# Patient Record
Sex: MALE | Race: BLACK OR AFRICAN AMERICAN | NOT HISPANIC OR LATINO | Employment: STUDENT | URBAN - METROPOLITAN AREA
[De-identification: names, ages, dates, MRNs, and addresses within clinical notes are randomized per-mention and may not be internally consistent; named-entity substitution may affect disease eponyms.]

---
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
## 2022-01-04 ENCOUNTER — TELEPHONE (OUTPATIENT)
Dept: PSYCHIATRY | Facility: CLINIC | Age: 22
End: 2022-01-04

## 2022-01-06 ENCOUNTER — TELEMEDICINE (OUTPATIENT)
Dept: PSYCHIATRY | Facility: CLINIC | Age: 22
End: 2022-01-06
Payer: COMMERCIAL

## 2022-01-06 DIAGNOSIS — F63.81 INTERMITTENT EXPLOSIVE DISORDER IN ADULT: ICD-10-CM

## 2022-01-06 DIAGNOSIS — F41.1 GENERALIZED ANXIETY DISORDER: Primary | ICD-10-CM

## 2022-01-06 DIAGNOSIS — Z79.899 MEDICAL CANNABIS USE: ICD-10-CM

## 2022-01-06 DIAGNOSIS — F42.2 MIXED OBSESSIONAL THOUGHTS AND ACTS: ICD-10-CM

## 2022-01-06 PROCEDURE — 99214 OFFICE O/P EST MOD 30 MIN: CPT | Performed by: STUDENT IN AN ORGANIZED HEALTH CARE EDUCATION/TRAINING PROGRAM

## 2022-01-06 PROCEDURE — 90833 PSYTX W PT W E/M 30 MIN: CPT | Performed by: STUDENT IN AN ORGANIZED HEALTH CARE EDUCATION/TRAINING PROGRAM
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
## 2022-02-15 ENCOUNTER — TELEMEDICINE (OUTPATIENT)
Dept: BEHAVIORAL/MENTAL HEALTH CLINIC | Facility: CLINIC | Age: 22
End: 2022-02-15
Payer: COMMERCIAL

## 2022-02-15 DIAGNOSIS — F42.2 MIXED OBSESSIONAL THOUGHTS AND ACTS: ICD-10-CM

## 2022-02-15 DIAGNOSIS — F63.81 INTERMITTENT EXPLOSIVE DISORDER IN ADULT: ICD-10-CM

## 2022-02-15 DIAGNOSIS — F41.1 GENERALIZED ANXIETY DISORDER: Primary | ICD-10-CM

## 2022-02-15 PROCEDURE — 90834 PSYTX W PT 45 MINUTES: CPT | Performed by: SOCIAL WORKER
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
## 2022-03-04 ENCOUNTER — APPOINTMENT (OUTPATIENT)
Dept: LAB | Facility: MEDICAL CENTER | Age: 22
End: 2022-03-04
Payer: COMMERCIAL

## 2022-03-11 ENCOUNTER — HOSPITAL ENCOUNTER (EMERGENCY)
Facility: HOSPITAL | Age: 22
End: 2022-03-11
Attending: EMERGENCY MEDICINE
Payer: COMMERCIAL

## 2022-03-11 ENCOUNTER — HOSPITAL ENCOUNTER (OUTPATIENT)
Facility: HOSPITAL | Age: 22
Setting detail: OBSERVATION
Discharge: HOME | End: 2022-03-13
Attending: INTERNAL MEDICINE | Admitting: SURGERY
Payer: COMMERCIAL

## 2022-03-11 ENCOUNTER — APPOINTMENT (EMERGENCY)
Dept: CT IMAGING | Facility: HOSPITAL | Age: 22
End: 2022-03-11
Payer: COMMERCIAL

## 2022-03-11 VITALS
DIASTOLIC BLOOD PRESSURE: 81 MMHG | HEIGHT: 75 IN | RESPIRATION RATE: 17 BRPM | OXYGEN SATURATION: 97 % | TEMPERATURE: 96.4 F | BODY MASS INDEX: 22.38 KG/M2 | HEART RATE: 52 BPM | SYSTOLIC BLOOD PRESSURE: 122 MMHG | WEIGHT: 180 LBS

## 2022-03-11 DIAGNOSIS — K35.30 ACUTE APPENDICITIS WITH LOCALIZED PERITONITIS, WITHOUT PERFORATION, ABSCESS, OR GANGRENE: Primary | ICD-10-CM

## 2022-03-11 DIAGNOSIS — R31.9 HEMATURIA, UNSPECIFIED TYPE: ICD-10-CM

## 2022-03-11 DIAGNOSIS — K35.80 ACUTE APPENDICITIS: Primary | ICD-10-CM

## 2022-03-11 DIAGNOSIS — N28.89 URETEROCELE: ICD-10-CM

## 2022-03-11 DIAGNOSIS — K37 APPENDICITIS: ICD-10-CM

## 2022-03-11 LAB
ALBUMIN SERPL BCP-MCNC: 4 G/DL (ref 3.5–5)
ALP SERPL-CCNC: 75 U/L (ref 46–484)
ALT SERPL W P-5'-P-CCNC: 30 U/L (ref 12–78)
ANION GAP SERPL CALCULATED.3IONS-SCNC: 10 MMOL/L (ref 4–13)
AST SERPL W P-5'-P-CCNC: 17 U/L (ref 5–45)
BASOPHILS # BLD AUTO: 0.04 THOUSANDS/ΜL (ref 0–0.1)
BASOPHILS NFR BLD AUTO: 1 % (ref 0–1)
BILIRUB SERPL-MCNC: 0.65 MG/DL (ref 0.2–1)
BILIRUB UR QL STRIP: NEGATIVE
BUN SERPL-MCNC: 11 MG/DL (ref 5–25)
CALCIUM SERPL-MCNC: 9 MG/DL (ref 8.3–10.1)
CHLORIDE SERPL-SCNC: 103 MMOL/L (ref 100–108)
CLARITY UR: CLEAR
CO2 SERPL-SCNC: 26 MMOL/L (ref 21–32)
COLOR UR: YELLOW
CREAT SERPL-MCNC: 1 MG/DL (ref 0.6–1.3)
EOSINOPHIL # BLD AUTO: 0.06 THOUSAND/ΜL (ref 0–0.61)
EOSINOPHIL NFR BLD AUTO: 1 % (ref 0–6)
ERYTHROCYTE [DISTWIDTH] IN BLOOD BY AUTOMATED COUNT: 12.7 % (ref 11.6–15.1)
GFR SERPL CREATININE-BSD FRML MDRD: 108 ML/MIN/1.73SQ M
GLUCOSE SERPL-MCNC: 88 MG/DL (ref 65–140)
GLUCOSE UR STRIP-MCNC: NEGATIVE MG/DL
HCT VFR BLD AUTO: 43.2 % (ref 36.5–49.3)
HGB BLD-MCNC: 15.1 G/DL (ref 12–17)
HGB UR QL STRIP.AUTO: NEGATIVE
IMM GRANULOCYTES # BLD AUTO: 0.02 THOUSAND/UL (ref 0–0.2)
IMM GRANULOCYTES NFR BLD AUTO: 0 % (ref 0–2)
KETONES UR STRIP-MCNC: NEGATIVE MG/DL
LEUKOCYTE ESTERASE UR QL STRIP: NEGATIVE
LIPASE SERPL-CCNC: 52 U/L (ref 73–393)
LYMPHOCYTES # BLD AUTO: 1.61 THOUSANDS/ΜL (ref 0.6–4.47)
LYMPHOCYTES NFR BLD AUTO: 27 % (ref 14–44)
MCH RBC QN AUTO: 30.3 PG (ref 26.8–34.3)
MCHC RBC AUTO-ENTMCNC: 35 G/DL (ref 31.4–37.4)
MCV RBC AUTO: 87 FL (ref 82–98)
MONOCYTES # BLD AUTO: 0.47 THOUSAND/ΜL (ref 0.17–1.22)
MONOCYTES NFR BLD AUTO: 8 % (ref 4–12)
NEUTROPHILS # BLD AUTO: 3.8 THOUSANDS/ΜL (ref 1.85–7.62)
NEUTS SEG NFR BLD AUTO: 63 % (ref 43–75)
NITRITE UR QL STRIP: NEGATIVE
NRBC BLD AUTO-RTO: 0 /100 WBCS
PH UR STRIP.AUTO: 6 [PH]
PLATELET # BLD AUTO: 254 THOUSANDS/UL (ref 149–390)
PMV BLD AUTO: 8.9 FL (ref 8.9–12.7)
POTASSIUM SERPL-SCNC: 3.6 MMOL/L (ref 3.5–5.3)
PROT SERPL-MCNC: 7.2 G/DL (ref 6.4–8.2)
PROT UR STRIP-MCNC: NEGATIVE MG/DL
RBC # BLD AUTO: 4.99 MILLION/UL (ref 3.88–5.62)
SODIUM SERPL-SCNC: 139 MMOL/L (ref 136–145)
SP GR UR STRIP.AUTO: <=1.005 (ref 1–1.03)
UROBILINOGEN UR QL STRIP.AUTO: 0.2 E.U./DL
WBC # BLD AUTO: 6 THOUSAND/UL (ref 4.31–10.16)

## 2022-03-11 PROCEDURE — 74177 CT ABD & PELVIS W/CONTRAST: CPT

## 2022-03-11 PROCEDURE — 81003 URINALYSIS AUTO W/O SCOPE: CPT | Performed by: EMERGENCY MEDICINE

## 2022-03-11 PROCEDURE — 96375 TX/PRO/DX INJ NEW DRUG ADDON: CPT

## 2022-03-11 PROCEDURE — 36415 COLL VENOUS BLD VENIPUNCTURE: CPT | Performed by: EMERGENCY MEDICINE

## 2022-03-11 PROCEDURE — 99285 EMERGENCY DEPT VISIT HI MDM: CPT

## 2022-03-11 PROCEDURE — 96365 THER/PROPH/DIAG IV INF INIT: CPT

## 2022-03-11 PROCEDURE — 85025 COMPLETE CBC W/AUTO DIFF WBC: CPT | Performed by: EMERGENCY MEDICINE

## 2022-03-11 PROCEDURE — 80053 COMPREHEN METABOLIC PANEL: CPT | Performed by: EMERGENCY MEDICINE

## 2022-03-11 PROCEDURE — 99220 PR INITIAL OBSERVATION CARE/DAY 70 MINUTES: CPT | Performed by: SURGERY

## 2022-03-11 PROCEDURE — 83690 ASSAY OF LIPASE: CPT | Performed by: EMERGENCY MEDICINE

## 2022-03-11 PROCEDURE — 99285 EMERGENCY DEPT VISIT HI MDM: CPT | Performed by: INTERNAL MEDICINE

## 2022-03-11 PROCEDURE — 99285 EMERGENCY DEPT VISIT HI MDM: CPT | Performed by: EMERGENCY MEDICINE

## 2022-03-11 PROCEDURE — 96361 HYDRATE IV INFUSION ADD-ON: CPT

## 2022-03-11 RX ORDER — DEXTROSE AND SODIUM CHLORIDE 5; .9 G/100ML; G/100ML
125 INJECTION, SOLUTION INTRAVENOUS CONTINUOUS
Status: DISCONTINUED | OUTPATIENT
Start: 2022-03-11 | End: 2022-03-11 | Stop reason: HOSPADM

## 2022-03-11 RX ORDER — HEPARIN SODIUM 5000 [USP'U]/ML
5000 INJECTION, SOLUTION INTRAVENOUS; SUBCUTANEOUS EVERY 8 HOURS SCHEDULED
Status: DISCONTINUED | OUTPATIENT
Start: 2022-03-11 | End: 2022-03-13 | Stop reason: HOSPADM

## 2022-03-11 RX ORDER — KETOROLAC TROMETHAMINE 30 MG/ML
30 INJECTION, SOLUTION INTRAMUSCULAR; INTRAVENOUS ONCE
Status: COMPLETED | OUTPATIENT
Start: 2022-03-11 | End: 2022-03-11

## 2022-03-11 RX ORDER — KETOROLAC TROMETHAMINE 30 MG/ML
15 INJECTION, SOLUTION INTRAMUSCULAR; INTRAVENOUS EVERY 6 HOURS PRN
Status: DISCONTINUED | OUTPATIENT
Start: 2022-03-11 | End: 2022-03-12

## 2022-03-11 RX ORDER — HYDROMORPHONE HCL IN WATER/PF 6 MG/30 ML
0.2 PATIENT CONTROLLED ANALGESIA SYRINGE INTRAVENOUS EVERY 4 HOURS PRN
Status: DISCONTINUED | OUTPATIENT
Start: 2022-03-11 | End: 2022-03-13 | Stop reason: HOSPADM

## 2022-03-11 RX ORDER — ONDANSETRON 2 MG/ML
4 INJECTION INTRAMUSCULAR; INTRAVENOUS EVERY 6 HOURS PRN
Status: DISCONTINUED | OUTPATIENT
Start: 2022-03-11 | End: 2022-03-13 | Stop reason: HOSPADM

## 2022-03-11 RX ADMIN — DEXTROSE AND SODIUM CHLORIDE 125 ML/HR: 5; .9 INJECTION, SOLUTION INTRAVENOUS at 19:12

## 2022-03-11 RX ADMIN — IOHEXOL 100 ML: 350 INJECTION, SOLUTION INTRAVENOUS at 16:39

## 2022-03-11 RX ADMIN — PIPERACILLIN AND TAZOBACTAM 3.38 G: 3; .375 INJECTION, POWDER, LYOPHILIZED, FOR SOLUTION INTRAVENOUS at 18:26

## 2022-03-11 RX ADMIN — KETOROLAC TROMETHAMINE 30 MG: 30 INJECTION, SOLUTION INTRAMUSCULAR at 15:06

## 2022-03-11 NOTE — ED NOTES
Patient transported to [facility], [address] [place] [address]  03/11/22 1991

## 2022-03-11 NOTE — ED PROVIDER NOTES
History  Chief Complaint   Patient presents with    Abdominal Pain     pt was urinated blood to PCP sent him for a CT of the ABD  the CT showed an elanged appendix and pt was sent to ED      Patient is a 72-year-old male  He does have a history of asthma  Presents to the emergency room with a 3 day history of pain to the right lower quadrant  He is also having low back pain  He has had hematuria  No dysuria  No fever or chills  No nausea or vomiting  No diarrhea or constipation  He had an outpatient CT scan which showed a dilated appendix without appendicitis and dilated ureter  He was sent to the emergency room for further evaluation treatment  His primary MD recommended a contrast CT  Patient reports that the right lower quadrant pain is somewhat better but the back pain persists  No associated motor or sensory complaints  No injury  Symptoms are moderate in severity  No aggravating or relieving factors  Prior to Admission Medications   Prescriptions Last Dose Informant Patient Reported? Taking? ADVAIR HFA 45-21 MCG/ACT inhaler   Yes No   Biotin 1 MG CAPS   Yes No   Sig: Take by mouth Indications: unknown dosage  Cholecalciferol (VITAMIN D) 2000 UNITS CAPS   Yes No   Sig: Take by mouth  FLUoxetine (PROzac) 10 mg capsule   No No   Sig: TAKE 1 CAPSULE PER DAY IN ADDITION TO 20MG CAPSULE FOR TOTAL OF 30MG PER DAY     FLUoxetine (PROzac) 20 mg capsule   No No   Sig: TAKE 1 CAPSULE BY MOUTH EVERY DAY   Multiple Vitamin (MULTI-DAY VITAMINS PO)   Yes No   Sig: daily   Riboflavin (VITAMIN B-2) 100 MG TABS   Yes No   Sig: TAKE 1 TABLET BY MOUTH TWICE DAILY   albuterol (PROVENTIL HFA,VENTOLIN HFA) 90 mcg/act inhaler   No No   Sig: Inhale 1 puff every 6 (six) hours   cetirizine-pseudoephedrine (ZyrTEC-D) 5-120 MG per tablet   Yes No   Sig: Take 1 tablet by mouth   fluticasone (FLONASE) 50 mcg/act nasal spray   Yes No   Sig: into each nostril   hydrOXYzine HCL (ATARAX) 25 mg tablet   No No Sig: Take 1 tablet (25 mg total) by mouth 3 (three) times a day as needed for anxiety   ipratropium-albuterol (DUO-NEB) 0 5-2 5 mg/3 mL   Yes No   Sig: Inhale   levalbuterol (XOPENEX HFA) 45 mcg/act inhaler   Yes No   Sig: Inhale 1-2 puffs every 4 (four) hours as needed for wheezing    magnesium oxide (MAG-OX) 400 mg   No No   Sig: Take 1 tablet (400 mg total) by mouth 2 (two) times a day   Patient not taking: Reported on 2/7/2020   montelukast (SINGULAIR) 10 mg tablet   Yes No   Sig: Take 10 mg by mouth daily at bedtime  ondansetron (ZOFRAN) 4 mg tablet   No No   Sig: Take 1 tablet (4 mg total) by mouth every 6 (six) hours      Facility-Administered Medications: None       Past Medical History:   Diagnosis Date    Allergic     enviromental    Asthma     Concussion     Sprain and strain of left wrist        Past Surgical History:   Procedure Laterality Date    LYMPH NODE DISSECTION  2010    MYRINGOTOMY W/ TUBES         Family History   Problem Relation Age of Onset    No Known Problems Father      I have reviewed and agree with the history as documented  E-Cigarette/Vaping     E-Cigarette/Vaping Substances     Social History     Tobacco Use    Smoking status: Never Smoker    Smokeless tobacco: Never Used   Substance Use Topics    Alcohol use: Never    Drug use: Never       Review of Systems   Constitutional: Negative for chills and fever  HENT: Negative for rhinorrhea and sore throat  Eyes: Negative for pain, redness and visual disturbance  Respiratory: Negative for cough and shortness of breath  Cardiovascular: Negative for chest pain and leg swelling  Gastrointestinal: Positive for abdominal pain  Negative for diarrhea and vomiting  Endocrine: Negative for polydipsia and polyuria  Genitourinary: Positive for hematuria  Negative for dysuria and frequency  Musculoskeletal: Positive for back pain  Negative for neck pain  Skin: Negative for rash and wound     Allergic/Immunologic: Negative for immunocompromised state  Neurological: Negative for weakness, numbness and headaches  Psychiatric/Behavioral: Negative for hallucinations and suicidal ideas  All other systems reviewed and are negative  Physical Exam  Physical Exam  Vitals reviewed  Constitutional:       General: He is not in acute distress  Appearance: He is not toxic-appearing  HENT:      Head: Normocephalic and atraumatic  Nose: Nose normal       Mouth/Throat:      Mouth: Mucous membranes are moist    Eyes:      General:         Right eye: No discharge  Left eye: No discharge  Conjunctiva/sclera: Conjunctivae normal    Cardiovascular:      Rate and Rhythm: Normal rate and regular rhythm  Pulses: Normal pulses  Heart sounds: Normal heart sounds  No murmur heard  No friction rub  No gallop  Pulmonary:      Effort: Pulmonary effort is normal  No respiratory distress  Breath sounds: Normal breath sounds  No stridor  No wheezing, rhonchi or rales  Abdominal:      General: Bowel sounds are normal  There is no distension  Palpations: Abdomen is soft  Tenderness: There is abdominal tenderness in the right lower quadrant  There is right CVA tenderness and left CVA tenderness  There is no guarding or rebound  Comments: There is mild right lower quadrant tenderness without peritoneal signs  There is bilateral CVA tenderness worse on the right  Musculoskeletal:         General: No swelling, tenderness, deformity or signs of injury  Normal range of motion  Cervical back: Normal range of motion and neck supple  No rigidity  Right lower leg: No edema  Left lower leg: No edema  Comments: No calf pain or unilateral leg swelling   Skin:     General: Skin is warm and dry  Coloration: Skin is not jaundiced or pale  Findings: No bruising, erythema or rash  Neurological:      General: No focal deficit present        Mental Status: He is alert and oriented to person, place, and time  Cranial Nerves: No facial asymmetry  Sensory: No sensory deficit  Motor: Motor function is intact     Psychiatric:         Mood and Affect: Mood normal          Behavior: Behavior normal          Vital Signs  ED Triage Vitals [03/11/22 1455]   Temperature Pulse Respirations Blood Pressure SpO2   (!) 96 4 °F (35 8 °C) 95 18 128/68 96 %      Temp Source Heart Rate Source Patient Position - Orthostatic VS BP Location FiO2 (%)   Temporal Monitor Lying Right arm --      Pain Score       3           Vitals:    03/11/22 1730 03/11/22 1800 03/11/22 1900 03/11/22 1909   BP: 142/82 144/81 121/81 122/81   Pulse: 61 68 (!) 53 (!) 52   Patient Position - Orthostatic VS: Sitting Sitting Sitting Lying         Visual Acuity      ED Medications  Medications   ketorolac (TORADOL) injection 30 mg (30 mg Intravenous Given 3/11/22 1506)   iohexol (OMNIPAQUE) 350 MG/ML injection (SINGLE-DOSE) 100 mL (100 mL Intravenous Given 3/11/22 1639)       Diagnostic Studies  Results Reviewed     Procedure Component Value Units Date/Time    UA w Reflex to Microscopic w Reflex to Culture [[id number]] Collected: 03/11/22 1548    Lab Status: Final result Specimen: Urine, Clean Catch Updated: 03/11/22 1554     Color, UA Yellow     Clarity, UA Clear     Specific Gravity, UA <=1 005     pH, UA 6 0     Leukocytes, UA Negative     Nitrite, UA Negative     Protein, UA Negative mg/dl      Glucose, UA Negative mg/dl      Ketones, UA Negative mg/dl      Urobilinogen, UA 0 2 E U /dl      Bilirubin, UA Negative     Blood, UA Negative    Lipase [[id number]]  (Abnormal) Collected: 03/11/22 1503    Lab Status: Final result Specimen: Blood from Arm, Right Updated: 03/11/22 1536     Lipase 52 u/L     Comprehensive metabolic panel [[id number]] Collected: 03/11/22 1503    Lab Status: Final result Specimen: Blood from Arm, Right Updated: 03/11/22 1534     Sodium 139 mmol/L      Potassium 3 6 mmol/L      Chloride 103 mmol/L CO2 26 mmol/L      ANION GAP 10 mmol/L      BUN 11 mg/dL      Creatinine 1 00 mg/dL      Glucose 88 mg/dL      Calcium 9 0 mg/dL      AST 17 U/L      ALT 30 U/L      Alkaline Phosphatase 75 U/L      Total Protein 7 2 g/dL      Albumin 4 0 g/dL      Total Bilirubin 0 65 mg/dL      eGFR 108 ml/min/1 73sq m     Narrative:      National Kidney Disease Foundation guidelines for Chronic Kidney Disease (CKD):     Stage 1 with normal or high GFR (GFR > 90 mL/min/1 73 square meters)    Stage 2 Mild CKD (GFR = 60-89 mL/min/1 73 square meters)    Stage 3A Moderate CKD (GFR = 45-59 mL/min/1 73 square meters)    Stage 3B Moderate CKD (GFR = 30-44 mL/min/1 73 square meters)    Stage 4 Severe CKD (GFR = 15-29 mL/min/1 73 square meters)    Stage 5 End Stage CKD (GFR <15 mL/min/1 73 square meters)  Note: GFR calculation is accurate only with a steady state creatinine    CBC and differential [[id number]] Collected: 03/11/22 1503    Lab Status: Final result Specimen: Blood from Arm, Right Updated: 03/11/22 1515     WBC 6 00 Thousand/uL      RBC 4 99 Million/uL      Hemoglobin 15 1 g/dL      Hematocrit 43 2 %      MCV 87 fL      MCH 30 3 pg      MCHC 35 0 g/dL      RDW 12 7 %      MPV 8 9 fL      Platelets 016 Thousands/uL      nRBC 0 /100 WBCs      Neutrophils Relative 63 %      Immat GRANS % 0 %      Lymphocytes Relative 27 %      Monocytes Relative 8 %      Eosinophils Relative 1 %      Basophils Relative 1 %      Neutrophils Absolute 3 80 Thousands/µL      Immature Grans Absolute 0 02 Thousand/uL      Lymphocytes Absolute 1 61 Thousands/µL      Monocytes Absolute 0 47 Thousand/µL      Eosinophils Absolute 0 06 Thousand/µL      Basophils Absolute 0 04 Thousands/µL                  CT abdomen pelvis with contrast   Final Result by Brielle Santizo MD (03/11 5794)      1  Findings of acute uncomplicated appendicitis     2   Mild right hydroureteronephrosis secondary to a 2 5 cm intravesical ureterocele, 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    3   Decreased aortomesenteric angle with narrowed aortomesenteric distance, nonspecific findings which can be seen both in asymptomatic patients and in patients with vascular compression of the 3rd portion of the duodenum (SMA syndrome)  I personally discussed this study with Ngozi Lozano on 3/11/2022 at 5:13 PM                   Workstation performed: [id number]                    Procedures  Procedures         ED Course         CRANAOMIT      Most Recent Value   SBIRT (13-21 yo)    In order to provide better care to our patients, we are screening all of our patients for alcohol and drug use  Would it be okay to ask you these screening questions? Yes Filed at: 03/11/2022 1500   [facility] Initial Screen: During the past 12 months, did you:    1  Drink any alcohol (more than a few sips)? No Filed at: 03/11/2022 1500   2  Smoke any marijuana or hashish No Filed at: 03/11/2022 1500   3  Use anything else to get high? ("anything else" includes illegal drugs, over the counter and prescription drugs, and things that you sniff or 'toledo')? No Filed at: 03/11/2022 1500                                          [facility]  Number of Diagnoses or Management Options  Diagnosis management comments: CT scan shows early appendicitis  Laboratory evaluation was unremarkable  Urinalysis was normal   CT scan did suggest a ureterocele  No OR available here today  Made arrangements for transfer to the [facility]  Accepted by Dr Madiha Pryor         Amount and/or Complexity of Data Reviewed  Clinical lab tests: ordered and reviewed  Tests in the radiology section of CPT®: reviewed and ordered  Review and summarize past medical records: yes  Discuss the patient with other providers: yes        Disposition  Final diagnoses:   Acute appendicitis     Time reflects when diagnosis was documented in both MDM as applicable and the Disposition within this note     Time User Action Codes Description Comment    3/11/2022  5:55 PM Rozena Haughton Add [K37] Appendicitis     3/11/2022  5:55 PM Rozena Haughton Remove [K37] Appendicitis     3/11/2022  5:55 PM Rozena Haughton Add [K35 80] Acute appendicitis       ED Disposition     ED Disposition Condition Date/Time Comment    Transfer to Another Facility-In Network  Fri Mar 11, 2022  5:54 PM Sheila Moon should be transferred out to [address] [place] [address] carbon        MD Documentation      Most Recent Value   Patient Condition The patient has been stabilized such that within reasonable medical probability, no material deterioration of the patient condition or the condition of the unborn child(anamika) is likely to result from the transfer   Reason for Transfer Level of Care needed not available at this facility   Benefits of Transfer Specialized equipment and/or services available at the receiving facility (Include comment)________________________   Risks of Transfer Potential for delay in receiving treatment, Potential deterioration of medical condition, Loss of IV, Increased discomfort during transfer, Possible worsening of condition or death during transfer   Accepting Physician Audrey Loo Name, [address] carbon   Sending MD Sylvia Camarillo   Provider Certification Unanticipated needs of medical equipment and personnel during transport, General risk, such as traffic hazards, adverse weather conditions, rough terrain or turbulence, possible failure of equipment (including vehicle or aircraft), or consequences of actions of persons outside the control of the transport personnel, Risk of worsening condition, The possibility of a transport vehicle being unavailable      RN Documentation      Most Recent Value   [address] Name, [address] [employer]   Report Given to [facility]      Follow-up Information    None         Discharge Medication List as of 3/11/2022  8:12 PM      CONTINUE these medications which have NOT CHANGED    Details   ADVAIR HFA 45-21 MCG/ACT inhaler Starting Wed 3/27/2019, Historical Med      albuterol (PROVENTIL HFA,VENTOLIN HFA) 90 mcg/act inhaler Inhale 1 puff every 6 (six) hours, Starting Fri 11/29/2019, Normal      Biotin 1 MG CAPS Take by mouth Indications: unknown dosage  , Until Discontinued, Historical Med      cetirizine-pseudoephedrine (ZyrTEC-D) 5-120 MG per tablet Take 1 tablet by mouth, Historical Med      Cholecalciferol (VITAMIN D) 2000 UNITS CAPS Take by mouth , Until Discontinued, Historical Med      !! FLUoxetine (PROzac) 10 mg capsule TAKE 1 CAPSULE PER DAY IN ADDITION TO 20MG CAPSULE FOR TOTAL OF 30MG PER DAY , Normal      !! FLUoxetine (PROzac) 20 mg capsule TAKE 1 CAPSULE BY MOUTH EVERY DAY, Normal      fluticasone (FLONASE) 50 mcg/act nasal spray into each nostril, Historical Med      hydrOXYzine HCL (ATARAX) 25 mg tablet Take 1 tablet (25 mg total) by mouth 3 (three) times a day as needed for anxiety, Starting Mon 11/8/2021, Normal      ipratropium-albuterol (DUO-NEB) 0 5-2 5 mg/3 mL Inhale, Historical Med      levalbuterol (XOPENEX HFA) 45 mcg/act inhaler Inhale 1-2 puffs every 4 (four) hours as needed for wheezing , Until Discontinued, Historical Med      magnesium oxide (MAG-OX) 400 mg Take 1 tablet (400 mg total) by mouth 2 (two) times a day, Starting Sat 8/31/2019, Normal      montelukast (SINGULAIR) 10 mg tablet Take 10 mg by mouth daily at bedtime  , Until Discontinued, Historical Med      Multiple Vitamin (MULTI-DAY VITAMINS PO) daily, Historical Med      ondansetron (ZOFRAN) 4 mg tablet Take 1 tablet (4 mg total) by mouth every 6 (six) hours, Starting Fri 2/7/2020, Normal      Riboflavin (VITAMIN B-2) 100 MG TABS TAKE 1 TABLET BY MOUTH TWICE DAILY, Historical Med       !! - Potential duplicate medications found  Please discuss with provider  No discharge procedures on file      PDMP Review     None          ED Provider  Electronically Signed by           Wu Sims MD  03/11/22 2052

## 2022-03-11 NOTE — EMTALA/ACUTE CARE TRANSFER
[facility] EMERGENCY DEPARTMENT  [address] [place] [id number]  Dept: [phone]      EMTALA TRANSFER CONSENT    NAME Mary Bermudez                                         2000                              MRN [id number]    I have been informed of my rights regarding examination, treatment, and transfer   by Dr Lucien Beatty MD    Benefits: Specialized equipment and/or services available at the receiving facility (Include comment)________________________    Risks: Potential for delay in receiving treatment,Potential deterioration of medical condition,Loss of IV,Increased discomfort during transfer,Possible worsening of condition or death during transfer      Consent for Transfer:  I acknowledge that my medical condition has been evaluated and explained to me by the emergency department physician or other qualified medical person and/or my attending physician, who has recommended that I be transferred to the service of  Accepting Physician: Domenic Huntley at [address] Name, [address] : [place]  The above potential benefits of such transfer, the potential risks associated with such transfer, and the probable risks of not being transferred have been explained to me, and I fully understand them  The doctor has explained that, in my case, the benefits of transfer outweigh the risks  I agree to be transferred  I authorize the performance of emergency medical procedures and treatments upon me in both transit and upon arrival at the receiving facility  Additionally, I authorize the release of any and all medical records to the receiving facility and request they be transported with me, if possible  I understand that the safest mode of transportation during a medical emergency is an ambulance and that the Hospital advocates the use of this mode of transport   Risks of traveling to the receiving facility by car, including absence of medical control, life sustaining equipment, such as oxygen, and medical personnel has been explained to me and I fully understand them  (LAMAR CORRECT BOX BELOW)  [  ]  I consent to the stated transfer and to be transported by ambulance/helicopter  [  ]  I consent to the stated transfer, but refuse transportation by ambulance and accept full responsibility for my transportation by car  I understand the risks of non-ambulance transfers and I exonerate the Hospital and its staff from any deterioration in my condition that results from this refusal     X___________________________________________    DATE  22  TIME________  Signature of patient or legally responsible individual signing on patient behalf           RELATIONSHIP TO PATIENT_________________________          Provider Certification    NAME Brittany Vazquez                                         2000                              MRN [id number]    A medical screening exam was performed on the above named patient  Based on the examination:    Condition Necessitating Transfer The encounter diagnosis was Acute appendicitis      Patient Condition: The patient has been stabilized such that within reasonable medical probability, no material deterioration of the patient condition or the condition of the unborn child(anamika) is likely to result from the transfer    Reason for Transfer: Level of Care needed not available at this facility    Transfer Requirements: [address]   · Space available and qualified personnel available for treatment as acknowledged by    · Agreed to accept transfer and to provide appropriate medical treatment as acknowledged by       Zuleika Avila  · Appropriate medical records of the examination and treatment of the patient are provided at the time of transfer   [address], [address] _______  · Transfer will be performed by qualified personnel from    and appropriate transfer equipment as required, including the use of necessary and appropriate life support measures  Provider Certification: I have examined the patient and explained the following risks and benefits of being transferred/refusing transfer to the patient/family:  Unanticipated needs of medical equipment and personnel during transport,General risk, such as traffic hazards, adverse weather conditions, rough terrain or turbulence, possible failure of equipment (including vehicle or aircraft), or consequences of actions of persons outside the control of the transport personnel,Risk of worsening condition,The possibility of a transport vehicle being unavailable      Based on these reasonable risks and benefits to the patient and/or the unborn child(anamika), and based upon the information available at the time of the patients examination, I certify that the medical benefits reasonably to be expected from the provision of appropriate medical treatments at another medical facility outweigh the increasing risks, if any, to the individuals medical condition, and in the case of labor to the unborn child, from effecting the transfer      X____________________________________________ DATE 03/11/22        TIME_______      ORIGINAL - SEND TO MEDICAL RECORDS   COPY - SEND WITH PATIENT DURING TRANSFER

## 2022-03-11 NOTE — LETTER
[address] [address]  Dept: [phone]    March 13, 2022     Patient: Jaylyn Perez   YOB: 2000   Date of Visit: 3/11/2022       To Whom it May Concern:    Citlali Lindsay is under my professional care  He was seen in the hospital from 3/11/2022   to 03/13/22  He may return to school on 3/16/22 with the following limitations: no heavy lifting (nothing greater than 10lbs) and no strenuous exercise until cleared by general surgeon  If you have any questions or concerns, please don't hesitate to call           Sincerely,          Mariana Goldman PA-C

## 2022-03-12 ENCOUNTER — ANESTHESIA (OUTPATIENT)
Dept: PERIOP | Facility: HOSPITAL | Age: 22
End: 2022-03-12
Payer: COMMERCIAL

## 2022-03-12 ENCOUNTER — ANESTHESIA EVENT (OUTPATIENT)
Dept: PERIOP | Facility: HOSPITAL | Age: 22
End: 2022-03-12
Payer: COMMERCIAL

## 2022-03-12 LAB
ABO GROUP BLD: NORMAL
ANION GAP SERPL CALCULATED.3IONS-SCNC: 5 MMOL/L (ref 4–13)
APTT PPP: 28 SECONDS (ref 23–37)
BASOPHILS # BLD AUTO: 0.02 THOUSANDS/ΜL (ref 0–0.1)
BASOPHILS NFR BLD AUTO: 0 % (ref 0–1)
BLD GP AB SCN SERPL QL: NEGATIVE
BUN SERPL-MCNC: 12 MG/DL (ref 5–25)
CALCIUM SERPL-MCNC: 9.1 MG/DL (ref 8.4–10.2)
CHLORIDE SERPL-SCNC: 105 MMOL/L (ref 96–108)
CO2 SERPL-SCNC: 28 MMOL/L (ref 21–32)
CREAT SERPL-MCNC: 1.19 MG/DL (ref 0.6–1.3)
EOSINOPHIL # BLD AUTO: 0.11 THOUSAND/ΜL (ref 0–0.61)
EOSINOPHIL NFR BLD AUTO: 2 % (ref 0–6)
ERYTHROCYTE [DISTWIDTH] IN BLOOD BY AUTOMATED COUNT: 12.8 % (ref 11.6–15.1)
GFR SERPL CREATININE-BSD FRML MDRD: 88 ML/MIN/1.73SQ M
GLUCOSE P FAST SERPL-MCNC: 85 MG/DL (ref 65–99)
GLUCOSE SERPL-MCNC: 85 MG/DL (ref 65–140)
HCT VFR BLD AUTO: 44.5 % (ref 36.5–49.3)
HGB BLD-MCNC: 15.1 G/DL (ref 12–17)
IMM GRANULOCYTES # BLD AUTO: 0.01 THOUSAND/UL (ref 0–0.2)
IMM GRANULOCYTES NFR BLD AUTO: 0 % (ref 0–2)
INR PPP: 1.02 (ref 0.84–1.19)
LYMPHOCYTES # BLD AUTO: 1.46 THOUSANDS/ΜL (ref 0.6–4.47)
LYMPHOCYTES NFR BLD AUTO: 27 % (ref 14–44)
MAGNESIUM SERPL-MCNC: 2.1 MG/DL (ref 1.9–2.7)
MCH RBC QN AUTO: 30.3 PG (ref 26.8–34.3)
MCHC RBC AUTO-ENTMCNC: 33.9 G/DL (ref 31.4–37.4)
MCV RBC AUTO: 89 FL (ref 82–98)
MONOCYTES # BLD AUTO: 0.51 THOUSAND/ΜL (ref 0.17–1.22)
MONOCYTES NFR BLD AUTO: 9 % (ref 4–12)
NEUTROPHILS # BLD AUTO: 3.39 THOUSANDS/ΜL (ref 1.85–7.62)
NEUTS SEG NFR BLD AUTO: 62 % (ref 43–75)
NRBC BLD AUTO-RTO: 0 /100 WBCS
PHOSPHATE SERPL-MCNC: 3.8 MG/DL (ref 2.7–4.5)
PLATELET # BLD AUTO: 216 THOUSANDS/UL (ref 149–390)
PMV BLD AUTO: 8.9 FL (ref 8.9–12.7)
POTASSIUM SERPL-SCNC: 3.9 MMOL/L (ref 3.5–5.3)
PROTHROMBIN TIME: 13.3 SECONDS (ref 11.6–14.5)
RBC # BLD AUTO: 4.99 MILLION/UL (ref 3.88–5.62)
RH BLD: POSITIVE
SODIUM SERPL-SCNC: 138 MMOL/L (ref 135–147)
SPECIMEN EXPIRATION DATE: NORMAL
WBC # BLD AUTO: 5.5 THOUSAND/UL (ref 4.31–10.16)

## 2022-03-12 PROCEDURE — 83735 ASSAY OF MAGNESIUM: CPT | Performed by: SURGERY

## 2022-03-12 PROCEDURE — 86900 BLOOD TYPING SEROLOGIC ABO: CPT | Performed by: SURGERY

## 2022-03-12 PROCEDURE — 85025 COMPLETE CBC W/AUTO DIFF WBC: CPT | Performed by: SURGERY

## 2022-03-12 PROCEDURE — 85730 THROMBOPLASTIN TIME PARTIAL: CPT | Performed by: SURGERY

## 2022-03-12 PROCEDURE — 88304 TISSUE EXAM BY PATHOLOGIST: CPT | Performed by: PATHOLOGY

## 2022-03-12 PROCEDURE — 44970 LAPAROSCOPY APPENDECTOMY: CPT | Performed by: PHYSICIAN ASSISTANT

## 2022-03-12 PROCEDURE — 86850 RBC ANTIBODY SCREEN: CPT | Performed by: SURGERY

## 2022-03-12 PROCEDURE — 36415 COLL VENOUS BLD VENIPUNCTURE: CPT | Performed by: SURGERY

## 2022-03-12 PROCEDURE — NC001 PR NO CHARGE: Performed by: PHYSICIAN ASSISTANT

## 2022-03-12 PROCEDURE — 86901 BLOOD TYPING SEROLOGIC RH(D): CPT | Performed by: SURGERY

## 2022-03-12 PROCEDURE — 85610 PROTHROMBIN TIME: CPT | Performed by: SURGERY

## 2022-03-12 PROCEDURE — 44970 LAPAROSCOPY APPENDECTOMY: CPT | Performed by: SURGERY

## 2022-03-12 PROCEDURE — 80048 BASIC METABOLIC PNL TOTAL CA: CPT | Performed by: SURGERY

## 2022-03-12 PROCEDURE — 84100 ASSAY OF PHOSPHORUS: CPT | Performed by: SURGERY

## 2022-03-12 RX ORDER — ROCURONIUM BROMIDE 10 MG/ML
INJECTION, SOLUTION INTRAVENOUS AS NEEDED
Status: DISCONTINUED | OUTPATIENT
Start: 2022-03-12 | End: 2022-03-12

## 2022-03-12 RX ORDER — ACETAMINOPHEN 325 MG/1
650 TABLET ORAL EVERY 8 HOURS SCHEDULED
Status: DISCONTINUED | OUTPATIENT
Start: 2022-03-12 | End: 2022-03-13 | Stop reason: HOSPADM

## 2022-03-12 RX ORDER — FENTANYL CITRATE 50 UG/ML
INJECTION, SOLUTION INTRAMUSCULAR; INTRAVENOUS AS NEEDED
Status: DISCONTINUED | OUTPATIENT
Start: 2022-03-12 | End: 2022-03-12

## 2022-03-12 RX ORDER — SODIUM CHLORIDE, SODIUM LACTATE, POTASSIUM CHLORIDE, CALCIUM CHLORIDE 600; 310; 30; 20 MG/100ML; MG/100ML; MG/100ML; MG/100ML
INJECTION, SOLUTION INTRAVENOUS CONTINUOUS PRN
Status: DISCONTINUED | OUTPATIENT
Start: 2022-03-12 | End: 2022-03-12

## 2022-03-12 RX ORDER — FENTANYL CITRATE/PF 50 MCG/ML
50 SYRINGE (ML) INJECTION
Status: DISCONTINUED | OUTPATIENT
Start: 2022-03-12 | End: 2022-03-12 | Stop reason: HOSPADM

## 2022-03-12 RX ORDER — PROPOFOL 10 MG/ML
INJECTION, EMULSION INTRAVENOUS AS NEEDED
Status: DISCONTINUED | OUTPATIENT
Start: 2022-03-12 | End: 2022-03-12

## 2022-03-12 RX ORDER — TRAMADOL HYDROCHLORIDE 50 MG/1
50 TABLET ORAL EVERY 6 HOURS PRN
Status: DISCONTINUED | OUTPATIENT
Start: 2022-03-12 | End: 2022-03-13 | Stop reason: HOSPADM

## 2022-03-12 RX ORDER — IBUPROFEN 400 MG/1
400 TABLET ORAL EVERY 8 HOURS SCHEDULED
Status: DISCONTINUED | OUTPATIENT
Start: 2022-03-12 | End: 2022-03-13 | Stop reason: HOSPADM

## 2022-03-12 RX ORDER — HYDROMORPHONE HCL/PF 1 MG/ML
0.4 SYRINGE (ML) INJECTION
Status: DISCONTINUED | OUTPATIENT
Start: 2022-03-12 | End: 2022-03-12 | Stop reason: HOSPADM

## 2022-03-12 RX ORDER — ACETAMINOPHEN 325 MG/1
650 TABLET ORAL EVERY 6 HOURS SCHEDULED
Status: DISCONTINUED | OUTPATIENT
Start: 2022-03-12 | End: 2022-03-12

## 2022-03-12 RX ORDER — MAGNESIUM HYDROXIDE 1200 MG/15ML
LIQUID ORAL AS NEEDED
Status: DISCONTINUED | OUTPATIENT
Start: 2022-03-12 | End: 2022-03-12 | Stop reason: HOSPADM

## 2022-03-12 RX ORDER — BUPIVACAINE HYDROCHLORIDE 5 MG/ML
INJECTION, SOLUTION PERINEURAL AS NEEDED
Status: DISCONTINUED | OUTPATIENT
Start: 2022-03-12 | End: 2022-03-12 | Stop reason: HOSPADM

## 2022-03-12 RX ORDER — MIDAZOLAM HYDROCHLORIDE 2 MG/2ML
INJECTION, SOLUTION INTRAMUSCULAR; INTRAVENOUS AS NEEDED
Status: DISCONTINUED | OUTPATIENT
Start: 2022-03-12 | End: 2022-03-12

## 2022-03-12 RX ORDER — LIDOCAINE HYDROCHLORIDE 10 MG/ML
INJECTION, SOLUTION EPIDURAL; INFILTRATION; INTRACAUDAL; PERINEURAL AS NEEDED
Status: DISCONTINUED | OUTPATIENT
Start: 2022-03-12 | End: 2022-03-12

## 2022-03-12 RX ORDER — IPRATROPIUM BROMIDE AND ALBUTEROL SULFATE 2.5; .5 MG/3ML; MG/3ML
3 SOLUTION RESPIRATORY (INHALATION) 4 TIMES DAILY PRN
Status: DISCONTINUED | OUTPATIENT
Start: 2022-03-12 | End: 2022-03-13 | Stop reason: HOSPADM

## 2022-03-12 RX ORDER — NEOSTIGMINE METHYLSULFATE 1 MG/ML
INJECTION INTRAVENOUS AS NEEDED
Status: DISCONTINUED | OUTPATIENT
Start: 2022-03-12 | End: 2022-03-12

## 2022-03-12 RX ORDER — ONDANSETRON 2 MG/ML
4 INJECTION INTRAMUSCULAR; INTRAVENOUS ONCE AS NEEDED
Status: DISCONTINUED | OUTPATIENT
Start: 2022-03-12 | End: 2022-03-12 | Stop reason: HOSPADM

## 2022-03-12 RX ORDER — KETOROLAC TROMETHAMINE 30 MG/ML
15 INJECTION, SOLUTION INTRAMUSCULAR; INTRAVENOUS ONCE
Status: COMPLETED | OUTPATIENT
Start: 2022-03-12 | End: 2022-03-12

## 2022-03-12 RX ORDER — DEXMEDETOMIDINE HYDROCHLORIDE 100 UG/ML
INJECTION, SOLUTION INTRAVENOUS AS NEEDED
Status: DISCONTINUED | OUTPATIENT
Start: 2022-03-12 | End: 2022-03-12

## 2022-03-12 RX ORDER — DEXTROSE AND SODIUM CHLORIDE 5; .45 G/100ML; G/100ML
INJECTION, SOLUTION INTRAVENOUS CONTINUOUS PRN
Status: DISCONTINUED | OUTPATIENT
Start: 2022-03-12 | End: 2022-03-12

## 2022-03-12 RX ORDER — DEXAMETHASONE SODIUM PHOSPHATE 10 MG/ML
INJECTION, SOLUTION INTRAMUSCULAR; INTRAVENOUS AS NEEDED
Status: DISCONTINUED | OUTPATIENT
Start: 2022-03-12 | End: 2022-03-12

## 2022-03-12 RX ORDER — SODIUM CHLORIDE, SODIUM LACTATE, POTASSIUM CHLORIDE, CALCIUM CHLORIDE 600; 310; 30; 20 MG/100ML; MG/100ML; MG/100ML; MG/100ML
50 INJECTION, SOLUTION INTRAVENOUS CONTINUOUS
Status: DISCONTINUED | OUTPATIENT
Start: 2022-03-12 | End: 2022-03-12

## 2022-03-12 RX ORDER — GLYCOPYRROLATE 0.2 MG/ML
INJECTION INTRAMUSCULAR; INTRAVENOUS AS NEEDED
Status: DISCONTINUED | OUTPATIENT
Start: 2022-03-12 | End: 2022-03-12

## 2022-03-12 RX ADMIN — DEXAMETHASONE SODIUM PHOSPHATE 10 MG: 10 INJECTION, SOLUTION INTRAMUSCULAR; INTRAVENOUS at 08:20

## 2022-03-12 RX ADMIN — ONDANSETRON 4 MG: 2 INJECTION INTRAMUSCULAR; INTRAVENOUS at 11:49

## 2022-03-12 RX ADMIN — GLYCOPYRROLATE 0.4 MG: 0.4 INJECTION INTRAMUSCULAR; INTRAVENOUS at 09:33

## 2022-03-12 RX ADMIN — IBUPROFEN 400 MG: 400 TABLET ORAL at 11:40

## 2022-03-12 RX ADMIN — ACETAMINOPHEN 650 MG: 325 TABLET ORAL at 13:24

## 2022-03-12 RX ADMIN — FENTANYL CITRATE 100 MCG: 50 INJECTION, SOLUTION INTRAMUSCULAR; INTRAVENOUS at 08:08

## 2022-03-12 RX ADMIN — DEXTROSE AND SODIUM CHLORIDE: 5; .45 INJECTION, SOLUTION INTRAVENOUS at 07:55

## 2022-03-12 RX ADMIN — PROPOFOL 200 MG: 10 INJECTION, EMULSION INTRAVENOUS at 08:08

## 2022-03-12 RX ADMIN — NEOSTIGMINE METHYLSULFATE 4 MG: 1 INJECTION INTRAVENOUS at 09:32

## 2022-03-12 RX ADMIN — IBUPROFEN 400 MG: 400 TABLET ORAL at 22:10

## 2022-03-12 RX ADMIN — SODIUM CHLORIDE 3.38 G: 900 INJECTION, SOLUTION INTRAVENOUS at 07:44

## 2022-03-12 RX ADMIN — SODIUM CHLORIDE, SODIUM LACTATE, POTASSIUM CHLORIDE, AND CALCIUM CHLORIDE: .6; .31; .03; .02 INJECTION, SOLUTION INTRAVENOUS at 08:31

## 2022-03-12 RX ADMIN — IBUPROFEN 400 MG: 400 TABLET ORAL at 16:21

## 2022-03-12 RX ADMIN — FENTANYL CITRATE 50 MCG: 50 INJECTION, SOLUTION INTRAMUSCULAR; INTRAVENOUS at 10:30

## 2022-03-12 RX ADMIN — FENTANYL CITRATE 50 MCG: 50 INJECTION, SOLUTION INTRAMUSCULAR; INTRAVENOUS at 10:39

## 2022-03-12 RX ADMIN — SODIUM CHLORIDE 3.38 G: 900 INJECTION, SOLUTION INTRAVENOUS at 03:09

## 2022-03-12 RX ADMIN — SODIUM CHLORIDE 3.38 G: 900 INJECTION, SOLUTION INTRAVENOUS at 18:15

## 2022-03-12 RX ADMIN — LIDOCAINE HYDROCHLORIDE 50 MG: 10 INJECTION, SOLUTION EPIDURAL; INFILTRATION; INTRACAUDAL; PERINEURAL at 08:08

## 2022-03-12 RX ADMIN — KETOROLAC TROMETHAMINE 15 MG: 30 INJECTION, SOLUTION INTRAMUSCULAR at 10:20

## 2022-03-12 RX ADMIN — SODIUM CHLORIDE 3.38 G: 900 INJECTION, SOLUTION INTRAVENOUS at 12:56

## 2022-03-12 RX ADMIN — ROCURONIUM BROMIDE 50 MG: 10 INJECTION INTRAVENOUS at 08:08

## 2022-03-12 RX ADMIN — DEXMEDETOMIDINE HYDROCHLORIDE 4 MCG: 100 INJECTION, SOLUTION INTRAVENOUS at 08:33

## 2022-03-12 RX ADMIN — MIDAZOLAM HYDROCHLORIDE 2 MG: 1 INJECTION, SOLUTION INTRAMUSCULAR; INTRAVENOUS at 08:08

## 2022-03-12 RX ADMIN — HEPARIN SODIUM 5000 UNITS: 5000 INJECTION INTRAVENOUS; SUBCUTANEOUS at 05:52

## 2022-03-12 RX ADMIN — Medication 0.4 MCG/KG/HR: at 08:33

## 2022-03-12 RX ADMIN — FENTANYL CITRATE 100 MCG: 50 INJECTION, SOLUTION INTRAMUSCULAR; INTRAVENOUS at 08:52

## 2022-03-12 RX ADMIN — ACETAMINOPHEN 650 MG: 325 TABLET ORAL at 22:09

## 2022-03-12 RX ADMIN — HYDROMORPHONE HYDROCHLORIDE 0.2 MG: 0.2 INJECTION, SOLUTION INTRAMUSCULAR; INTRAVENOUS; SUBCUTANEOUS at 13:24

## 2022-03-12 RX ADMIN — FLUOXETINE 30 MG: 20 CAPSULE ORAL at 15:14

## 2022-03-12 NOTE — QUICK NOTE
Patient with cramping pain and mild nausea  Feels very gassy and uncomfortable  Still has not voided  Has tried once but nothing came out  Exam is appropriate for postop with mild distention and mild tenderness throughout, incisions clean and dry  Will check bladder scan if no void within a few hours  Patient does not meet discharge criteria at this point and will need further observation, IVF hydration, IV pain and nausea medication  We will continue the IV antibiotics while he remains in house  Will not be discharging today as the patient is not meeting criteria   Home prozac ordered per patient request

## 2022-03-12 NOTE — UTILIZATION REVIEW
Initial Clinical Review    Admission: Date/Time/Statement:   Admission Orders (From admission, onward)     Ordered        03/11/22 2115  Place in Observation  Once                      Orders Placed This Encounter   Procedures    Place in Observation     Standing Status:   Standing     Number of Occurrences:   1     Order Specific Question:   Level of Care     Answer:   Med Surg [16]     ED Arrival Information     Expected Arrival Acuity    3/11/2022  3/11/2022 20:41 Urgent         Means of arrival Escorted by Service Admission type    Ambulance - Surgery-General Urgent         Arrival complaint    appendicitis        Chief Complaint   Patient presents with    Abdominal Pain     +appy       Initial Presentation: 25year old male, presented to the ED @ [address], Transferred to [facility], higher level of [facility], via EMS  Admitted as Observation due to Appendicitis  PMH:   Asthma, Concussion, Sprain/Strain of left wrist   Lymph node dissection  Myringotomy with tubes  Date: 03/11/2022  5 days of intermittent gross hematuria and right sided back pain, outpatient CT ordered by PCP showed concern for right sided hydroureteronephrosis and possible appendicitis  CT with contrast at [facility] ED confirmed early acute uncomplicated appendicitis and a right sided ureterocele measuring 2 5 x 2 4 x 2 4cm with mild right hydroureteronephrosis  WBC normal, vitals normal   Transferred to [place] for operative management  Patient to be kept NPO with IVF, IV zosyn overnight  Day 2: 03/12/2022  SURGERY DATE: 3/12/2022  Procedure(s) (LRB):  APPENDECTOMY LAPAROSCOPIC   Anesthesia Type:  General & Local  Operative Findings: Mildly indurated and hyperemic, long appendix, no signs of perforation or purulent contamination    Patient with cramping pain and mild nausea  Feels very gassy and uncomfortable  Still has not voided  Has tried once but nothing came out   Exam is appropriate for postop with mild distention and mild tenderness throughout, incisions clean and dry  Will check bladder scan if no void within a few hours  Patient does not meet discharge criteria at this point and will need further observation, IVF hydration, IV pain and nausea medication  We will continue the IV antibiotics while he remains in house  Will not be discharging today as the patient is not meeting criteria  Home prozac ordered per patient request       Triage Vitals   Temperature Pulse Respirations Blood Pressure SpO2   03/11/22 2043 03/11/22 2043 03/11/22 2043 03/11/22 2043 03/11/22 2043   98 6 °F (37 °C) 72 18 139/86 99 %      Temp Source Heart Rate Source Patient Position - Orthostatic VS BP Location FiO2 (%)   03/12/22 0309 03/11/22 2043 03/11/22 2043 03/11/22 2043 --   Tympanic Monitor Sitting Left arm       Pain Score       03/11/22 2043       5          Wt Readings from Last 1 Encounters:   03/11/22 81 6 kg (180 lb) (81 %, Z= 0 87)*     * Growth percentiles are based on CDC (Boys, 2-20 Years) data       Additional Vital Signs:   Date/Time Temp Pulse Resp BP MAP (mmHg) SpO2 O2 Flow Rate (L/min) O2 Device Cardiac (WDL) Patient Position - Orthostatic VS   03/12/22 1055 -- 50 Abnormal  14 116/67 87 95 % -- None (Room air) WDL --   03/12/22 1040 -- 56 16 138/65 93 99 % -- None (Room air) WDL --   03/12/22 1025 -- 57 16 123/77 96 99 % 6 L/min Simple mask WDL --   03/12/22 1010 -- 50 Abnormal  15 108/55 77 98 % 6 L/min Simple mask WDL --   03/12/22 0955 97 1 °F (36 2 °C) Abnormal  52 Abnormal  20 105/53 76 98 % 6 L/min Simple mask WDL --   03/12/22 0551 98 6 °F (37 °C) 45 Abnormal  18 144/85 -- 98 % -- None (Room air) -- Lying   03/12/22 0309 98 5 °F (36 9 °C) 41 Abnormal  18 140/84 -- 97 % -- None (Room air) -- Lying       Pertinent Labs/Diagnostic Test Results:   Results from last 7 days   Lab Units 03/12/22  0552 03/11/22  1503   WBC Thousand/uL 5 50 6 00   HEMOGLOBIN g/dL 15 1 15 1   HEMATOCRIT % 44 5 43 2   PLATELETS Thousands/uL 216 254   NEUTROS ABS Thousands/µL 3 39 3 80     Results from last 7 days   Lab Units 03/12/22  0552 03/11/22  1503   SODIUM mmol/L 138 139   POTASSIUM mmol/L 3 9 3 6   CHLORIDE mmol/L 105 103   CO2 mmol/L 28 26   ANION GAP mmol/L 5 10   BUN mg/dL 12 11   CREATININE mg/dL 1 19 1 00   EGFR ml/min/1 73sq m 88 108   CALCIUM mg/dL 9 1 9 0   MAGNESIUM mg/dL 2 1  --    PHOSPHORUS mg/dL 3 8  --      Results from last 7 days   Lab Units 03/11/22  1503   AST U/L 17   ALT U/L 30   ALK PHOS U/L 75   TOTAL PROTEIN g/dL 7 2   ALBUMIN g/dL 4 0   TOTAL BILIRUBIN mg/dL 0 65     Results from last 7 days   Lab Units 03/12/22  0552 03/11/22  1503   GLUCOSE RANDOM mg/dL 85 88     Results from last 7 days   Lab Units 03/12/22  0552   PROTIME seconds 13 3   INR  1 02   PTT seconds 28     Results from last 7 days   Lab Units 03/11/22  1503   LIPASE u/L 52*     Results from last 7 days   Lab Units 03/11/22  1548   CLARITY UA  Clear   COLOR UA  Yellow   SPEC GRAV UA  <=1 005   PH UA  6 0   GLUCOSE UA mg/dl Negative   KETONES UA mg/dl Negative   BLOOD UA  Negative   PROTEIN UA mg/dl Negative   NITRITE UA  Negative   BILIRUBIN UA  Negative   UROBILINOGEN UA E U /dl 0 2   LEUKOCYTES UA  Negative     ED Treatment:   Medication Administration from 03/11/2022 1827 to 03/12/2022 5090       Date/Time Order Dose Route Action     03/12/2022 0552 heparin (porcine) subcutaneous injection 5,000 Units 5,000 Units Subcutaneous Given     03/12/2022 0744 piperacillin-tazobactam (ZOSYN) IVPB 3 375 g 3 375 g Intravenous New Bag     03/12/2022 0309 piperacillin-tazobactam (ZOSYN) IVPB 3 375 g 3 375 g Intravenous New Bag        Past Medical History:   Diagnosis Date    Allergic     enviromental    Asthma     Concussion     Sprain and strain of left wrist      Present on Admission:   Acute appendicitis   Ureterocele      Admitting Diagnosis: Appendicitis [K37]  Ureterocele [N28 89]  Acute appendicitis with localized peritonitis, without perforation, abscess, or gangrene [K35 30]  Age/Sex: 25 y o  male  Admission Orders:        Scheduled Medications:  heparin (porcine), 5,000 Units, Subcutaneous, Q8H [address]  piperacillin-tazobactam, 3 375 g, Intravenous, Q6H      Continuous IV Infusions:  lactated ringers infusion  Rate: 50 mL/hr Dose: 50 mL/hr  Freq: Continuous Route: IV  Last Dose: 50 mL/hr (03/12/22 1141)  Start: 03/12/22 0945    PRN Meds:  HYDROmorphone, 0 2 mg, Intravenous, Q4H PRN  ketorolac, 15 mg, Intravenous, Q6H PRN  ondansetron, 4 mg, Intravenous, Q6H PRN   X 1 dose 3/12  sodium chloride, , , PRN        Network Utilization Review Department  ATTENTION: Please call with any questions or concerns to [phone] and carefully listen to the prompts so that you are directed to the right person  All voicemails are confidential   Lashawn Bosch all requests for admission clinical reviews, approved or denied determinations and any other requests to dedicated fax number below belonging to the campus where the patient is receiving treatment   List of dedicated fax numbers for the Facilities:  [address] DENIALS (Administrative/Medical Necessity) [phone]   [address] (Maternity/NICU/Pediatrics) [phone]   [address]  [address] [facility] [place] [address] [id number] [place] [address] [address] [facility] [phone]

## 2022-03-12 NOTE — ANESTHESIA POSTPROCEDURE EVALUATION
Post-Op Assessment Note    CV Status:  Stable  Pain Score: 0    Pain management: adequate     Mental Status:  Sleepy   Hydration Status:  Euvolemic   PONV Controlled:  Controlled   Airway Patency:  Patent      Post Op Vitals Reviewed: Yes      Staff: CRNA         No complications documented      BP   105/53   Temp   97 1   Pulse 56   Resp   12   SpO2   98

## 2022-03-12 NOTE — PLAN OF CARE
Problem: GASTROINTESTINAL - ADULT  Goal: Minimal or absence of nausea and/or vomiting  Description: INTERVENTIONS:  - Administer IV fluids if ordered to ensure adequate hydration  - Maintain NPO status until nausea and vomiting are resolved  - Nasogastric tube if ordered  - Administer ordered antiemetic medications as needed  - Provide nonpharmacologic comfort measures as appropriate  - Advance diet as tolerated, if ordered  - Consider nutrition services referral to assist patient with adequate nutrition and appropriate food choices  Outcome: Progressing  Goal: Maintains or returns to baseline bowel function  Description: INTERVENTIONS:  - Assess bowel function  - Encourage oral fluids to ensure adequate hydration  - Administer IV fluids if ordered to ensure adequate hydration  - Administer ordered medications as needed  - Encourage mobilization and activity  - Consider nutritional services referral to assist patient with adequate nutrition and appropriate food choices  Outcome: Progressing  Goal: Maintains adequate nutritional intake  Description: INTERVENTIONS:  - Monitor percentage of each meal consumed  - Identify factors contributing to decreased intake, treat as appropriate  - Assist with meals as needed  - Monitor I&O, weight, and lab values if indicated  - Obtain nutrition services referral as needed  Outcome: Progressing  Goal: Oral mucous membranes remain intact  Description: INTERVENTIONS  - Assess oral mucosa and hygiene practices  - Implement preventative oral hygiene regimen  - Implement oral medicated treatments as ordered  - Initiate Nutrition services referral as needed  Outcome: Progressing     Problem: SKIN/TISSUE INTEGRITY - ADULT  Goal: Skin Integrity remains intact(Skin Breakdown Prevention)  Description: Assess:  -Perform Sudeep assessment every shift  -Clean and moisturize skin every day  -Inspect skin when repositioning, toileting, and assisting with ADLS  -Assess extremities for adequate circulation and sensation     Bed Management:  -Have minimal linens on bed & keep smooth, unwrinkled  -Change linens as needed when moist or perspiring  Toileting:  -Offer bedside commode    Activity:  -Mobilize patient 3 times a day  -Encourage activity and walks on unit  -Encourage or provide ROM exercises   -Use appropriate equipment to lift or move patient in bed    Goal: Incision(s), wounds(s) or drain site(s) healing without S/S of infection  Description: INTERVENTIONS  - Assess and document dressing, incision, wound bed, drain sites and surrounding tissue  - Provide patient and family education  Outcome: Progressing     Problem: PAIN - ADULT  Goal: Verbalizes/displays adequate comfort level or baseline comfort level  Description: Interventions:  - Encourage patient to monitor pain and request assistance  - Assess pain using appropriate pain scale  - Administer analgesics based on type and severity of pain and evaluate response  - Implement non-pharmacological measures as appropriate and evaluate response  - Consider cultural and social influences on pain and pain management  - Notify physician/advanced practitioner if interventions unsuccessful or patient reports new pain  Outcome: Progressing     Problem: INFECTION - ADULT  Goal: Absence or prevention of progression during hospitalization  Description: INTERVENTIONS:  - Assess and monitor for signs and symptoms of infection  - Monitor lab/diagnostic results  - Monitor all insertion sites, i e  indwelling lines, tubes, and drains  - Monitor endotracheal if appropriate and nasal secretions for changes in amount and color  - Gordon appropriate cooling/warming therapies per order  - Administer medications as ordered  - Instruct and encourage patient and family to use good hand hygiene technique  - Identify and instruct in appropriate isolation precautions for identified infection/condition  Outcome: Progressing     Problem: SAFETY ADULT  Goal: Patient will remain free of falls  Description: INTERVENTIONS:  - Educate patient/family on patient safety including physical limitations  - Instruct patient to call for assistance with activity   - Consult OT/PT to assist with strengthening/mobility   - Keep Call bell within reach  - Keep bed low and locked with side rails adjusted as appropriate  - Keep care items and personal belongings within reach  - Initiate and maintain comfort rounds  - Make Fall Risk Sign visible to staff  - Offer Toileting every 2 Hours, in advance of need  - Apply yellow socks and bracelet for high fall risk patients  - Consider moving patient to room near nurses station  Outcome: Progressing  Goal: Maintain or return to baseline ADL function  Description: INTERVENTIONS:  -  Assess patient's ability to carry out ADLs; assess patient's baseline for ADL function and identify physical deficits which impact ability to perform ADLs (bathing, care of mouth/teeth, toileting, grooming, dressing, etc )  - Assess/evaluate cause of self-care deficits   - Assess range of motion  - Assess patient's mobility; develop plan if impaired  - Assess patient's need for assistive devices and provide as appropriate  - Encourage maximum independence but intervene and supervise when necessary  - Involve family in performance of ADLs  - Assess for home care needs following discharge   - Consider OT consult to assist with ADL evaluation and planning for discharge  - Provide patient education as appropriate  Outcome: Progressing  Goal: Maintains/Returns to pre admission functional level  Description: INTERVENTIONS:  - Perform BMAT or MOVE assessment daily    - Set and communicate daily mobility goal to care team and patient/family/caregiver     - Collaborate with rehabilitation services on mobility goals if consulted  - Out of bed for toileting  - Record patient progress and toleration of activity level   Outcome: Progressing     Problem: DISCHARGE PLANNING  Goal: Discharge to home or other facility with appropriate resources  Description: INTERVENTIONS:  - Identify barriers to discharge w/patient and caregiver  - Arrange for needed discharge resources and transportation as appropriate  - Identify discharge learning needs (meds, wound care, etc )  - Arrange for interpretive services to assist at discharge as needed  - Refer to Case Management Department for coordinating discharge planning if the patient needs post-hospital services based on physician/advanced practitioner order or complex needs related to functional status, cognitive ability, or social support system  Outcome: Progressing     Problem: Knowledge Deficit  Goal: Patient/family/caregiver demonstrates understanding of disease process, treatment plan, medications, and discharge instructions  Description: Complete learning assessment and assess knowledge base    Interventions:  - Provide teaching at level of understanding  - Provide teaching via preferred learning methods  Outcome: Progressing

## 2022-03-12 NOTE — ED PROVIDER NOTES
History  Chief Complaint   Patient presents with    Abdominal Pain     +appy     HPI    Prior to Admission Medications   Prescriptions Last Dose Informant Patient Reported? Taking? ADVAIR HFA 45-21 MCG/ACT inhaler   Yes No   Biotin 1 MG CAPS   Yes No   Sig: Take by mouth Indications: unknown dosage  Cholecalciferol (VITAMIN D) 2000 UNITS CAPS   Yes No   Sig: Take by mouth  FLUoxetine (PROzac) 10 mg capsule   No No   Sig: TAKE 1 CAPSULE PER DAY IN ADDITION TO 20MG CAPSULE FOR TOTAL OF 30MG PER DAY  FLUoxetine (PROzac) 20 mg capsule   No No   Sig: TAKE 1 CAPSULE BY MOUTH EVERY DAY   Multiple Vitamin (MULTI-DAY VITAMINS PO)   Yes No   Sig: daily   Riboflavin (VITAMIN B-2) 100 MG TABS   Yes No   Sig: TAKE 1 TABLET BY MOUTH TWICE DAILY   albuterol (PROVENTIL HFA,VENTOLIN HFA) 90 mcg/act inhaler   No No   Sig: Inhale 1 puff every 6 (six) hours   cetirizine-pseudoephedrine (ZyrTEC-D) 5-120 MG per tablet   Yes No   Sig: Take 1 tablet by mouth   fluticasone (FLONASE) 50 mcg/act nasal spray   Yes No   Sig: into each nostril   hydrOXYzine HCL (ATARAX) 25 mg tablet   No No   Sig: Take 1 tablet (25 mg total) by mouth 3 (three) times a day as needed for anxiety   ipratropium-albuterol (DUO-NEB) 0 5-2 5 mg/3 mL   Yes No   Sig: Inhale   levalbuterol (XOPENEX HFA) 45 mcg/act inhaler   Yes No   Sig: Inhale 1-2 puffs every 4 (four) hours as needed for wheezing    magnesium oxide (MAG-OX) 400 mg   No No   Sig: Take 1 tablet (400 mg total) by mouth 2 (two) times a day   Patient not taking: Reported on 2/7/2020   montelukast (SINGULAIR) 10 mg tablet   Yes No   Sig: Take 10 mg by mouth daily at bedtime     ondansetron (ZOFRAN) 4 mg tablet   No No   Sig: Take 1 tablet (4 mg total) by mouth every 6 (six) hours      Facility-Administered Medications: None       Past Medical History:   Diagnosis Date    Allergic     enviromental    Asthma     Concussion     Sprain and strain of left wrist        Past Surgical History:   Procedure Laterality Date    LYMPH NODE DISSECTION  2010    MYRINGOTOMY W/ TUBES         Family History   Problem Relation Age of Onset    No Known Problems Father      I have reviewed and agree with the history as documented  E-Cigarette/Vaping     E-Cigarette/Vaping Substances     Social History     Tobacco Use    Smoking status: Never Smoker    Smokeless tobacco: Never Used   Substance Use Topics    Alcohol use: Never    Drug use: Never       Review of Systems   Constitutional: Negative  Respiratory: Negative  Cardiovascular: Negative  Gastrointestinal: Positive for abdominal pain  Genitourinary: Positive for hematuria  No active hematuria at this time  Skin: Negative  Neurological: Negative  Hematological: Negative  Psychiatric/Behavioral: Negative  Physical Exam  Physical Exam  Vitals and nursing note reviewed  Constitutional:       General: He is not in acute distress  Appearance: He is well-developed  He is not ill-appearing  Cardiovascular:      Rate and Rhythm: Normal rate and regular rhythm  Heart sounds: Normal heart sounds  Pulmonary:      Effort: Pulmonary effort is normal       Breath sounds: Normal breath sounds  Abdominal:      General: Abdomen is flat  Bowel sounds are normal  There is no distension or abdominal bruit  Palpations: Abdomen is soft  Tenderness: There is abdominal tenderness in the right lower quadrant  There is no right CVA tenderness or left CVA tenderness  Hernia: No hernia is present  Skin:     Capillary Refill: Capillary refill takes less than 2 seconds  Neurological:      General: No focal deficit present  Mental Status: He is alert     Psychiatric:         Mood and Affect: Mood normal          Behavior: Behavior normal          Vital Signs  ED Triage Vitals [03/11/22 2043]   Temperature Pulse Respirations Blood Pressure SpO2   98 6 °F (37 °C) 72 18 139/86 99 %      Temp src Heart Rate Source Patient Position - Orthostatic VS BP Location FiO2 (%)   -- Monitor Sitting Left arm --      Pain Score       5           Vitals:    03/11/22 2043   BP: 139/86   Pulse: 72   Patient Position - Orthostatic VS: Sitting         Visual Acuity      ED Medications  Medications   heparin (porcine) subcutaneous injection 5,000 Units (has no administration in time range)   piperacillin-tazobactam (ZOSYN) IVPB 3 375 g (has no administration in time range)   ondansetron (ZOFRAN) injection 4 mg (has no administration in time range)   HYDROmorphone HCl (DILAUDID) injection 0 2 mg (has no administration in time range)   ketorolac (TORADOL) injection 15 mg (has no administration in time range)       Diagnostic Studies  Results Reviewed     None                 No orders to display              Procedures  Procedures         ED Course  ED Course as of 03/11/22 2126   Fri Mar 11, 2022   245 25year-old male transferred [email or web address] with findings of acute appendicitis  Patient this time has no complaints no abdominal pain nausea vomiting fever chills rigors  Patient was seen by Dr Lynn Falcon in the emergency room and placed on her service for admission for appendectomy in the morning  Patient additional complaint of hematuria and was originally referred for CT scan due to hematuria which showed significant findings  Patient's hematuria has resolved however does have a follow-up appointment with Urology  DAVID      Most Recent Value   SBIRT (13-23 yo)    In order to provide better care to our patients, we are screening all of our patients for alcohol and drug use  Would it be okay to ask you these screening questions? Yes Filed at: 03/11/2022 2102   DAVID Initial Screen: During the past 12 months, did you:    1  Drink any alcohol (more than a few sips)? Yes Filed at: 03/11/2022 2102   2  Smoke any marijuana or hashish Yes Filed at: 03/11/2022 2102   3   Use anything else to get high? ("anything else" includes illegal drugs, over the counter and prescription drugs, and things that you sniff or 'toledo')? Yes Filed at: 03/11/2022 2102                                          MDM    Disposition  Final diagnoses:   None     ED Disposition     None      Follow-up Information    None         Patient's Medications   Discharge Prescriptions    No medications on file       No discharge procedures on file      PDMP Review     None          ED Provider  Electronically Signed by           Josselyn Mendez MD  03/11/22 7025

## 2022-03-12 NOTE — ED NOTES
Pt was able to eat a fruit cup and crackers, without any n/v  Pt is currently resting     Vladimir Armstrong RN  03/12/22 0172

## 2022-03-12 NOTE — ED NOTES
Left ED in stable condition  [id number]  Infusing at 125cc/hr 20 20g IV cath RAC  Cell phone sent w/ pt  Accompanied by EMS        Yazmin Brunson RN  03/11/22 2010

## 2022-03-12 NOTE — ANESTHESIA PREPROCEDURE EVALUATION
Procedure:  APPENDECTOMY LAPAROSCOPIC (N/A Abdomen)    26 y/o M for appy  Uses medical marijuana  Denies other rec drug use  States rare alcohol consumption  Relevant Problems   ANESTHESIA (within normal limits)      CARDIO (within normal limits)      ENDO (within normal limits)      GI/HEPATIC  appendicitis        /RENAL  hematuria, ureterocele        HEMATOLOGY (within normal limits)      MUSCULOSKELETAL (within normal limits)      NEURO/PSYCH  H/O concussion 1/22  No residual s/s   (+) Generalized anxiety disorder   (+) Obsessive compulsive disorder      PULMONARY (within normal limits)        Physical Exam    Airway    Mallampati score: I  TM Distance: >3 FB  Neck ROM: full     Dental   No notable dental hx     Cardiovascular  Cardiovascular exam normal    Pulmonary  Pulmonary exam normal     Other Findings        Anesthesia Plan  ASA Score- 2     Anesthesia Type-         Additional Monitors:   Airway Plan: ETT  Plan Factors-Exercise tolerance (METS): >4 METS  Chart reviewed  Existing labs reviewed  Patient summary reviewed  Induction- intravenous  Postoperative Plan- Plan for postoperative opioid use  Planned trial extubation    Informed Consent- Anesthetic plan and risks discussed with patient  I personally reviewed this patient with the CRNA  Discussed and agreed on the Anesthesia Plan with the CRNA  Tanesha Warren

## 2022-03-12 NOTE — OP NOTE
OPERATIVE REPORT  PATIENT NAME: Ham Orellana    :  2000  MRN: [id number]  Pt Location: [facility] OR ROOM 02    SURGERY DATE: 3/12/2022    Surgeon(s) and Role: Aidee Gunn MD - Primary  Arturo Hicks PA-C - Assisting    Preop Diagnosis:  Acute appendicitis with localized peritonitis, without perforation, abscess, or gangrene [K35 30]  Ureterocele [N28 89]    Post-Op Diagnosis Codes:  Acute appendicitis with localized peritonitis, without perforation, abscess, or gangrene [K35 30]  Ureterocele [N28 89]    Procedure(s) (LRB):  APPENDECTOMY LAPAROSCOPIC (N/A)    Specimen(s):  ID Type Source Tests Collected by Time Destination   1 : Appendix and contents Tissue Appendix TISSUE EXAM Aidee Gunn MD 3/12/2022 0915      Estimated Blood Loss:   Minimal    Anesthesia Type:   General  Local    Operative Indications:  Acute appendicitis with localized peritonitis, without perforation, abscess, or gangrene [K35 30]  Ureterocele [N28 89]  21M with hematuria that self resolved, outpatient CT scan with suspicion for early appendicitis, confirmed on contrast CT without evidence of perforation  Right sided ureterocele also noted on the imaging, UA was negative  Antibiotics and IVF given, informed consent obtained for laparoscopic appendectomy  Operative Findings:  -Mildly indurated and hyperemic, long appendix, no signs of perforation or purulent contamination    Complications:   None    Procedure and Technique:  The patient was taken to the operating room where he was properly identified monitored and anesthetized  He received antibiotics and heparin prophylaxis perioperatively  Venodyne's used for DVT prophylaxis  Scott catheter placed preoperatively  Abdomen prepped and draped under sterile conditions using aseptic technique  Timeout performed  Skin incised at the umbilicus  Dissection carried down to the fascia which was elevated and incised   The peritoneum was very thick and redundant and difficult to enter bluntly with a hyacinth clamp  The peritoneum was therefore grasped with two hemostats under direct visualization and incised sharply with a Metzenbaum scissors  12 mm trocar inserted and pneumoperitoneum created to 15 mmHg  5 mm 30° scope advanced  4 quadrants of the abdomen and inspected laparoscopically as well as the pelvis  Patient placed in steep Trendelenburg, left side down  2 additional working ports placed  These were 5 mm ports in the hypogastrium and left lower quadrant  The acutely inflamed appendix was exposed  It appeared long, hyperemic, and mildly indurated  It was grasped  Window made in the mesoappendix  The base of the appendix was divided with a blue load of an Endo ANDREW stapler  The mesoappendix was divided with a white load of an Endo ANDREW stapler  There was initially some oozing from the staple lines which both became hemostatic with some time  The appendix was placed in an Endobag and delivered through the umbilical trocar site  Pneumoperitoneum reestablished  The right lower quadrant wound irrigated and aspirated as was the pelvis  All pooled liquid aspirated out of the right upper quadrant  The patient flattened  Procedure completed with closure of the fascial defect at the umbilicus with two 0-vicryl interrupted sutures using a [place] Clayman technique  Skin closed with subcuticular 4-0 Monocryl suture  Wounds infiltrated with half percent Marcaine and dressed sterilely  The samuels was removed at the end of the case  The patient was extubated and taken to recovery in stable condition  Patient tolerated the procedure well   I was present for the entire procedure and a physician assistant was required during the procedure for retraction, tissue handling,dissection and suturing    Patient Disposition:  PACU       SIGNATURE: Edin Killian MD  DATE: March 12, 2022  TIME: 9:48 AM

## 2022-03-12 NOTE — H&P
H&P Exam - General Surgery   Gem Franklin 25 y o  male MRN: [id number]  Unit/Bed#: ED 01 Encounter: [id number]    Assessment/Plan     21M with 5 days of intermittent gross hematuria and right sided back pain, outpatient CT ordered by PCP showed concern for right sided hydroureteronephrosis and possible appendicitis  CT with contrast at [facility] ED confirmed early acute uncomplicated appendicitis and a right sided ureterocele measuring 2 5 x 2 4 x 2 4cm with mild right hydroureteronephrosis  WBC normal, vitals normal     Transferred to [place] for operative management  Patient to be kept NPO with IVF, IV zosyn overnight, informed consent obtained for laparoscopic, possible open appendectomy to be performed tomorrow morning at 8am  Will need a urologic evaluation as well, likely as an outpatient as his gross hematuria has resolved, UA is now clear, and urinary symptoms have resolved  Will reach out to urology to get their opinion and set up follow up  History of Present Illness     HPI:  Gem Franklin is a 25 y o  male with multiple episodes of gross hematuria and back pain Monday and Tuesday  Seen by his physician Dr Prashant Gonsalez who ordered outpatient workup including a noncontrast CT scan that was completed today showing possible right sided hydroureteronephrosis and concern for early appendicitis  He was sent to the [facility] ED for further evaluation  Vitals normal, UA negative, WBC 6, focally tender in right lower quadrant with some minimal right CVA tenderness  CT with contrast confirms early acute uncomplicated appendicitis and notes a congenital ureterocele and mild right hydroureteronephrosis, no obstructing stones  Given toradol, fluids, and zosyn and transferred to [place] for operative management  Review of Systems   Constitutional: Positive for activity change, appetite change and chills  Negative for fever  Respiratory: Negative for shortness of breath  Cardiovascular: Negative for chest pain  Gastrointestinal: Positive for abdominal pain and nausea  Negative for vomiting  Genitourinary: Positive for hematuria  Negative for difficulty urinating and dysuria  Hematological: Negative for adenopathy  Does not bruise/bleed easily  All other systems reviewed and are negative  Historical Information   Past Medical History:   Diagnosis Date    Allergic     enviromental    Asthma     Concussion     Sprain and strain of left wrist      Past Surgical History:   Procedure Laterality Date    LYMPH NODE DISSECTION  2010    MYRINGOTOMY W/ TUBES       Social History   Social History     Substance and Sexual Activity   Alcohol Use Never     Social History     Substance and Sexual Activity   Drug Use Never     Social History     Tobacco Use   Smoking Status Never Smoker   Smokeless Tobacco Never Used     Family History: non-contributory    Meds/Allergies   all medications and allergies reviewed  No Known Allergies    Objective   First Vitals:   Blood Pressure: 139/86 (03/11/22 2043)  Pulse: 72 (03/11/22 2043)  Temperature: 98 6 °F (37 °C) (03/11/22 2043)  Respirations: 18 (03/11/22 2043)  Weight - Scale: 81 6 kg (180 lb) (03/11/22 2043)  SpO2: 99 % (03/11/22 2043)    Current Vitals:   Blood Pressure: 139/86 (03/11/22 2043)  Pulse: 72 (03/11/22 2043)  Temperature: 98 6 °F (37 °C) (03/11/22 2043)  Respirations: 18 (03/11/22 2043)  Weight - Scale: 81 6 kg (180 lb) (03/11/22 2043)  SpO2: 99 % (03/11/22 2043)    No intake or output data in the 24 hours ending 03/11/22 2117    Invasive Devices  Report    Peripheral Intravenous Line            Peripheral IV 03/11/22 Right Antecubital <1 day                Physical Exam  Vitals reviewed  Constitutional:       General: He is not in acute distress  Appearance: He is not ill-appearing or diaphoretic  HENT:      Head: Normocephalic and atraumatic        Mouth/Throat:      Mouth: Mucous membranes are moist    Eyes:      Extraocular Movements: Extraocular movements intact  Pupils: Pupils are equal, round, and reactive to light  Cardiovascular:      Rate and Rhythm: Normal rate  Pulmonary:      Effort: Pulmonary effort is normal  No respiratory distress  Abdominal:      General: Abdomen is flat  There is no distension  Palpations: Abdomen is soft  There is no mass  Tenderness: There is no guarding or rebound  Hernia: No hernia is present  Comments: Focal right lower quadrant tenderness    Mild right CVA tenderness   Musculoskeletal:         General: Normal range of motion  Skin:     General: Skin is warm and dry  Coloration: Skin is not jaundiced  Neurological:      General: No focal deficit present  Mental Status: He is alert and oriented to person, place, and time  Psychiatric:         Mood and Affect: Mood normal          Behavior: Behavior normal          Lab Results:   I have personally reviewed pertinent lab results  , CBC:   Lab Results   Component Value Date    WBC 6 00 03/11/2022    HGB 15 1 03/11/2022    HCT 43 2 03/11/2022    MCV 87 03/11/2022     03/11/2022    MCH 30 3 03/11/2022    MCHC 35 0 03/11/2022    RDW 12 7 03/11/2022    MPV 8 9 03/11/2022    NRBC 0 03/11/2022   , CMP:   Lab Results   Component Value Date    SODIUM 139 03/11/2022    K 3 6 03/11/2022     03/11/2022    CO2 26 03/11/2022    BUN 11 03/11/2022    CREATININE 1 00 03/11/2022    CALCIUM 9 0 03/11/2022    AST 17 03/11/2022    ALT 30 03/11/2022    ALKPHOS 75 03/11/2022    EGFR 108 03/11/2022   , Urinalysis:   Lab Results   Component Value Date    COLORU Yellow 03/11/2022    CLARITYU Clear 03/11/2022    SPECGRAV <=1 005 03/11/2022    PHUR 6 0 03/11/2022    LEUKOCYTESUR Negative 03/11/2022    NITRITE Negative 03/11/2022    GLUCOSEU Negative 03/11/2022    KETONESU Negative 03/11/2022    BILIRUBINUR Negative 03/11/2022    BLOODU Negative 03/11/2022     Imaging: I have personally reviewed pertinent reports     and I have personally reviewed pertinent films in PACS    Signature:   Arash Strong MD  Date: 3/11/2022 Time: 9:17 PM

## 2022-03-13 VITALS
TEMPERATURE: 97.9 F | HEART RATE: 50 BPM | RESPIRATION RATE: 16 BRPM | HEIGHT: 75 IN | WEIGHT: 190 LBS | SYSTOLIC BLOOD PRESSURE: 137 MMHG | DIASTOLIC BLOOD PRESSURE: 82 MMHG | OXYGEN SATURATION: 96 % | BODY MASS INDEX: 23.62 KG/M2

## 2022-03-13 PROCEDURE — 99024 POSTOP FOLLOW-UP VISIT: CPT | Performed by: PHYSICIAN ASSISTANT

## 2022-03-13 PROCEDURE — NC001 PR NO CHARGE: Performed by: PHYSICIAN ASSISTANT

## 2022-03-13 RX ADMIN — TRAMADOL HYDROCHLORIDE 50 MG: 50 TABLET ORAL at 08:24

## 2022-03-13 RX ADMIN — IBUPROFEN 400 MG: 400 TABLET ORAL at 06:31

## 2022-03-13 RX ADMIN — ACETAMINOPHEN 650 MG: 325 TABLET ORAL at 06:31

## 2022-03-13 RX ADMIN — SODIUM CHLORIDE 3.38 G: 900 INJECTION, SOLUTION INTRAVENOUS at 00:36

## 2022-03-13 RX ADMIN — SODIUM CHLORIDE 3.38 G: 900 INJECTION, SOLUTION INTRAVENOUS at 08:24

## 2022-03-13 RX ADMIN — FLUOXETINE 30 MG: 20 CAPSULE ORAL at 08:24

## 2022-03-13 NOTE — PLAN OF CARE
Problem: GASTROINTESTINAL - ADULT  Goal: Minimal or absence of nausea and/or vomiting  Description: INTERVENTIONS:  - Administer IV fluids if ordered to ensure adequate hydration  - Maintain NPO status until nausea and vomiting are resolved  - Nasogastric tube if ordered  - Administer ordered antiemetic medications as needed  - Provide nonpharmacologic comfort measures as appropriate  - Advance diet as tolerated, if ordered  - Consider nutrition services referral to assist patient with adequate nutrition and appropriate food choices  Outcome: Progressing  Goal: Maintains or returns to baseline bowel function  Description: INTERVENTIONS:  - Assess bowel function  - Encourage oral fluids to ensure adequate hydration  - Administer IV fluids if ordered to ensure adequate hydration  - Administer ordered medications as needed  - Encourage mobilization and activity  - Consider nutritional services referral to assist patient with adequate nutrition and appropriate food choices  Outcome: Progressing  Goal: Maintains adequate nutritional intake  Description: INTERVENTIONS:  - Monitor percentage of each meal consumed  - Identify factors contributing to decreased intake, treat as appropriate  - Assist with meals as needed  - Monitor I&O, weight, and lab values if indicated  - Obtain nutrition services referral as needed  Outcome: Progressing  Goal: Oral mucous membranes remain intact  Description: INTERVENTIONS  - Assess oral mucosa and hygiene practices  - Implement preventative oral hygiene regimen  - Implement oral medicated treatments as ordered  - Initiate Nutrition services referral as needed  Outcome: Progressing     Problem: SKIN/TISSUE INTEGRITY - ADULT  Goal: Skin Integrity remains intact(Skin Breakdown Prevention)  Description: Assess:  -Perform Sudeep assessment every   -Clean and moisturize skin every   -Inspect skin when repositioning, toileting, and assisting with ADLS  -Assess under medical devices such as every   -Assess extremities for adequate circulation and sensation     Bed Management:  -Have minimal linens on bed & keep smooth, unwrinkled  -Change linens as needed when moist or perspiring  -Avoid sitting or lying in one position for more than  hours while in bed  -Keep HOB at degrees     Toileting:  -Offer bedside commode  -Assess for incontinence every   -Use incontinent care products after each incontinent episode such as     Activity:  -Mobilize patient  times a day  -Encourage activity and walks on unit  -Encourage or provide ROM exercises   -Turn and reposition patient every  Hours  -Use appropriate equipment to lift or move patient in bed  -Instruct/ Assist with weight shifting every  when out of bed in chair  -Consider limitation of chair time 2 hour intervals    Skin Care:  -Avoid use of baby powder, tape, friction and shearing, hot water or constrictive clothing  -Relieve pressure over bony prominences using   -Do not massage red bony areas    Next Steps:  -Teach patient strategies to minimize risks such as    -Consider consults to  interdisciplinary teams such as   Outcome: Progressing  Goal: Incision(s), wounds(s) or drain site(s) healing without S/S of infection  Description: INTERVENTIONS  - Assess and document dressing, incision, wound bed, drain sites and surrounding tissue  - Provide patient and family education  - Perform skin care/dressing changes rajat  Outcome: Progressing     Problem: PAIN - ADULT  Goal: Verbalizes/displays adequate comfort level or baseline comfort level  Description: Interventions:  - Encourage patient to monitor pain and request assistance  - Assess pain using appropriate pain scale  - Administer analgesics based on type and severity of pain and evaluate response  - Implement non-pharmacological measures as appropriate and evaluate response  - Consider cultural and social influences on pain and pain management  - Notify physician/advanced practitioner if interventions unsuccessful or patient reports new pain  Outcome: Progressing     Problem: INFECTION - ADULT  Goal: Absence or prevention of progression during hospitalization  Description: INTERVENTIONS:  - Assess and monitor for signs and symptoms of infection  - Monitor lab/diagnostic results  - Monitor all insertion sites, i e  indwelling lines, tubes, and drains  - Monitor endotracheal if appropriate and nasal secretions for changes in amount and color  - [place] appropriate cooling/warming therapies per order  - Administer medications as ordered  - Instruct and encourage patient and family to use good hand hygiene technique  - Identify and instruct in appropriate isolation precautions for identified infection/condition  Outcome: Progressing     Problem: SAFETY ADULT  Goal: Patient will remain free of falls  Description: INTERVENTIONS:  - Educate patient/family on patient safety including physical limitations  - Instruct patient to call for assistance with activity   - Consult OT/PT to assist with strengthening/mobility   - Keep Call bell within reach  - Keep bed low and locked with side rails adjusted as appropriate  - Keep care items and personal belongings within reach  - Initiate and maintain comfort rounds  - Make Fall Risk Sign visible to staff  - Offer Toileting every 2 Hours, in advance of need  - Apply yellow socks and bracelet for high fall risk patients  - Consider moving patient to room near nurses station  Outcome: Progressing  Goal: Maintain or return to baseline ADL function  Description: INTERVENTIONS:  -  Assess patient's ability to carry out ADLs; assess patient's baseline for ADL function and identify physical deficits which impact ability to perform ADLs (bathing, care of mouth/teeth, toileting, grooming, dressing, etc )  - Assess/evaluate cause of self-care deficits   - Assess range of motion  - Assess patient's mobility; develop plan if impaired  - Assess patient's need for assistive devices and provide as appropriate  - Encourage maximum independence but intervene and supervise when necessary  - Involve family in performance of ADLs  - Assess for home care needs following discharge   - Consider OT consult to assist with ADL evaluation and planning for discharge  - Provide patient education as appropriate  Outcome: Progressing  Goal: Maintains/Returns to pre admission functional level  Description: INTERVENTIONS:  - Perform BMAT or MOVE assessment daily    - Set and communicate daily mobility goal to care team and patient/family/caregiver  - Collaborate with rehabilitation services on mobility goals if consulted  - Out of bed for toileting  - Record patient progress and toleration of activity level   Outcome: Progressing     Problem: DISCHARGE PLANNING  Goal: Discharge to home or other facility with appropriate resources  Description: INTERVENTIONS:  - Identify barriers to discharge w/patient and caregiver  - Arrange for needed discharge resources and transportation as appropriate  - Identify discharge learning needs (meds, wound care, etc )  - Arrange for interpretive services to assist at discharge as needed  - Refer to Case Management Department for coordinating discharge planning if the patient needs post-hospital services based on physician/advanced practitioner order or complex needs related to functional status, cognitive ability, or social support system  Outcome: Progressing     Problem: Knowledge Deficit  Goal: Patient/family/caregiver demonstrates understanding of disease process, treatment plan, medications, and discharge instructions  Description: Complete learning assessment and assess knowledge base    Interventions:  - Provide teaching at level of understanding  - Provide teaching via preferred learning methods  Outcome: Progressing

## 2022-03-13 NOTE — NURSING NOTE
100% of dinner and 400cc water with no issues  Currently in bed with girlfriend, states pain is the same but bearable  Will reevaluate when ready to sleep  Parents at bedside

## 2022-03-13 NOTE — PROGRESS NOTES
Progress Note - General Surgery   Cheyenne Harris 25 y o  male MRN: [id number]  Unit/Bed#: -01 Encounter: [id number]    Assessment/Plan:  68-year-old male pod#1 s/p laparoscopic appendectomy for acute appendicitis  - afebrile, VSS  - no a m  labs ordered  - exam:  Abdomen soft, nondistended, appropriately tender to palpation around incisional sites, incisions C/D/I    - patient is feeling much improved today  Tolerating diet without nausea or vomiting, ambulating without difficulty, no urinary complaints, passing flatus, abdominal exam appropriate  Plan for discharge to home this afternoon   - CT AP from 3/11/22 significant for findings of intravesical ureterocele, patient will follow-up with Urology upon discharge  - follow-up with Dr Elsa Mullen in 2 weeks    Subjective/Objective   Subjective:  No acute events overnight  Patient remained in hospital overnight due to complaints of pain and mild nausea  Today, patient states he feels better  Tolerating diet without nausea or vomiting  Abdominal pain well controlled  He ambulated without difficulty  No urinary complaints  Passing flatus  Objective:   Blood pressure 137/82, pulse (!) 50, temperature 97 9 °F (36 6 °C), resp  rate 16, height 6' 3" (1 905 m), weight 86 3 kg (190 lb 4 1 oz), SpO2 96 %  ,Body mass index is 23 78 kg/m²        Intake/Output Summary (Last 24 hours) at 3/13/2022 5372  Last data filed at 3/12/2022 1935  Gross per 24 hour   Intake 1020 ml   Output 400 ml   Net 620 ml       Invasive Devices  Report    Peripheral Intravenous Line            Peripheral IV 03/11/22 Right Antecubital 1 day                Physical Exam: /82   Pulse (!) 50   Temp 97 9 °F (36 6 °C)   Resp 16   Ht 6' 3" (1 905 m)   Wt 86 3 kg (190 lb 4 1 oz)   SpO2 96%   BMI 23 78 kg/m²   General appearance:  Seen and examined while lying comfortably in bed, alert and oriented, in no acute distress, appears stated age and cooperative  Head: Normocephalic, without obvious abnormality, atraumatic  Lungs: clear to auscultation bilaterally, no wheeze, rales, rhonchi, no respiratory distress  Heart: regular rate and rhythm, S1, S2 normal, no murmur, click, rub or gallop  Abdomen: Soft, nondistended, appropriately tender to palpation around incisional sites, bowel sounds active throughout and normal in character, incisional sites C/D/I with glue  Extremities: extremities normal, warm and well-perfused; no cyanosis, clubbing, or edema    Lab, Imaging and other studies:  No a m  labs ordered  VTE Pharmacologic Prophylaxis: Heparin  VTE Mechanical Prophylaxis: sequential compression device      Arturo Hicks PA-C  03/13/22

## 2022-03-13 NOTE — DISCHARGE SUMMARY
Discharge Summary   Marta Garcia 25 y o  male MRN: [id number]  Unit/Bed#: -01 Encounter: [id number]  3/13/2022    Admission Date: 3/11/2022   Discharge Date: 3/13/2022    Reason for Admission: acute appendicitis   Admitting Diagnosis:   Appendicitis [K37]  Ureterocele [N28 89]  Acute appendicitis with localized peritonitis, without perforation, abscess, or gangrene [K35 30]    PMH/Secondary Diagnoses:  Past Medical History:   Diagnosis Date    Allergic     enviromental    Asthma     Concussion     Sprain and strain of left wrist      Past Surgical History:   Procedure Laterality Date    APPENDECTOMY      LYMPH NODE DISSECTION  2010    MYRINGOTOMY W/ TUBES         Discharge Diagnoses:   Principal Problem:    Acute appendicitis  Active Problems:    Ureterocele    Hematuria      Consultants:   none    Procedures/Surgeries Performed:  Procedure(s):  APPENDECTOMY LAPAROSCOPIC    Significant Findings:  CT abdomen pelvis with contrast    Result Date: 3/11/2022  Impression: 1  Findings of acute uncomplicated appendicitis  2   Mild right hydroureteronephrosis secondary to a 2 5 cm intravesical ureterocele, 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 Elizabeth Sinclair MD      FLUoxetine  30 mg Oral Daily Elizabeth Sinclair MD      heparin (porcine)  5,000 Units Subcutaneous [facility] Elizabeth Sinclair MD      HYDROmorphone  0 2 mg Intravenous Q4H PRN Elizabeth Sinclair MD      ibuprofen  400 mg Oral Q8H [facility] Cezar PULLIAM Andrea Mejía MD      ipratropium-albuterol  3 mL Nebulization 4x Daily PRN Marisa Laureano MD      ondansetron  4 mg Intravenous Q6H PRN Marisa Laureano MD      piperacillin-tazobactam  3 375 g Intravenous Q6H Marisa Laureano MD      traMADol  50 mg Oral Q6H PRN Marisa Laureano MD       Continuous Infusions:   PRN Meds:HYDROmorphone    ipratropium-albuterol    ondansetron    traMADol      History of Present Illness: As per Dr Vidya Mathur : "Jaylyn Perez is a 25 y o  male with multiple episodes of gross hematuria and back pain Monday and Tuesday  Seen by his physician Dr Josemanuel Gale who ordered outpatient workup including a noncontrast CT scan that was completed today showing possible right sided hydroureteronephrosis and concern for early appendicitis  He was sent to the [facility] ED for further evaluation  Vitals normal, UA negative, WBC 6, focally tender in right lower quadrant with some minimal right CVA tenderness  CT with contrast confirms early acute uncomplicated appendicitis and notes a congenital ureterocele and mild right hydroureteronephrosis, no obstructing stones  Given toradol, fluids, and zosyn and transferred to [place] for operative management "      Summary of Hospital Course:  Patient presented to [facility] on 03/11/2022 with complaints abdominal pain  After evaluation and workup patient was found have acute appendicitis at this time was transferred to [place] [address] to undergo laparoscopic appendectomy with Dr Andrea Mejía  Laparoscopic appendectomy scheduled for 03/12/2022  Postoperatively patient was experiencing cramping abdominal pain and mild nausea  The decision was made to have him remain in the hospital overnight for IV fluid hydration, pain control, antiemetics, serial abdominal exams  On 03/13/2022 patient had significant improvement in pain and no longer complained of nausea  Tolerating diet without nausea/vomiting  Ambulating without difficulty    No urinary complaints  Passing flatus  Erlinda Bond to return home  Patient was discharged on 03/13/2022  Discharge instructions including various limitations were discussed with patient in detail and written materials were provided  A note was provided for no heavy lifting and no strenuous exercise until cleared by general surgeon  Patient will follow-up with general surgery office in 2 weeks or sooner if questions/concerns arise  Indications for return to ED reviewed with patient  Patient understands and agrees to treatment plan  Note:  On CT abdomen pelvis which was obtained due to presentation with abdominal pain patient was found to have mild right hydronephrosis secondary to a 2 5 cm intervesical ureterocele, 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   Discussed with Urology, outpatient follow-up arranged  Complications: none      Plan Upon Discharge:  - follow up with Dr Madiha Pryor in 2 weeks or sooner if questions/concerns arise   - Outpatient consultation to urology placed  -Discharge instructions discussed with the patient and a print out AVS of these instructions were given to the patient by the nurse upon discharge  Discharge Diagnosis: acute appendicitis s/p laparoscopic appendectomy   Principal Problem:    Acute appendicitis  Active Problems:    Ureterocele    Hematuria      Physical Exam on Day of Discharge:   Please see detailed progress note from day of discharge    Condition at Discharge: good       Discharge instructions/Information to patient and family:   See after visit summary for information provided to patient and family  Provisions for Follow-Up Care:  See after visit summary for information related to follow-up care and any pertinent home health orders  PCP: Gifty Rivers MD    Disposition: See After Visit Summary for discharge disposition information  Planned Readmission: No    Discharge Statement   I spent 25 minutes discharging the patient  This time was spent on the day of discharge   I had direct contact with the patient on the day of discharge  Additional documentation is required if more than 30 minutes were spent on discharge  Discharge Medications:  See after visit summary for reconciled discharge medications provided to patient and family        Madie Lovelace PA-C   3/13/2022

## 2022-03-13 NOTE — PLAN OF CARE
Problem: GASTROINTESTINAL - ADULT  Goal: Minimal or absence of nausea and/or vomiting  Description: INTERVENTIONS:  - Administer IV fluids if ordered to ensure adequate hydration  - Maintain NPO status until nausea and vomiting are resolved  - Nasogastric tube if ordered  - Administer ordered antiemetic medications as needed  - Provide nonpharmacologic comfort measures as appropriate  - Advance diet as tolerated, if ordered  - Consider nutrition services referral to assist patient with adequate nutrition and appropriate food choices  3/13/2022 1040 by Martha Najera RN  Outcome: Adequate for Discharge  3/13/2022 0811 by Martha Najera RN  Outcome: Progressing  Goal: Maintains or returns to baseline bowel function  Description: INTERVENTIONS:  - Assess bowel function  - Encourage oral fluids to ensure adequate hydration  - Administer IV fluids if ordered to ensure adequate hydration  - Administer ordered medications as needed  - Encourage mobilization and activity  - Consider nutritional services referral to assist patient with adequate nutrition and appropriate food choices  Outcome: Adequate for Discharge  Goal: Maintains adequate nutritional intake  Description: INTERVENTIONS:  - Monitor percentage of each meal consumed  - Identify factors contributing to decreased intake, treat as appropriate  - Assist with meals as needed  - Monitor I&O, weight, and lab values if indicated  - Obtain nutrition services referral as needed  Outcome: Adequate for Discharge  Goal: Oral mucous membranes remain intact  Description: INTERVENTIONS  - Assess oral mucosa and hygiene practices  - Implement preventative oral hygiene regimen  - Implement oral medicated treatments as ordered  - Initiate Nutrition services referral as needed  Outcome: Adequate for Discharge     Problem: SKIN/TISSUE INTEGRITY - ADULT  Goal: Skin Integrity remains intact(Skin Breakdown Prevention)  Description: Assess:  -Perform Sudeep assessment every shift  -Inspect skin when repositioning, toileting, and assisting with ADLS    Skin Care:  -Avoid use of baby powder, tape, friction and shearing, hot water or constrictive clothing  Outcome: Adequate for Discharge  Goal: Incision(s), wounds(s) or drain site(s) healing without S/S of infection  Description: INTERVENTIONS  - Assess and document dressing, incision, wound bed, drain sites and surrounding tissue  - Provide patient and family education     Problem: PAIN - ADULT  Goal: Verbalizes/displays adequate comfort level or baseline comfort level  Description: Interventions:  - Encourage patient to monitor pain and request assistance  - Assess pain using appropriate pain scale  - Administer analgesics based on type and severity of pain and evaluate response  - Implement non-pharmacological measures as appropriate and evaluate response  - Consider cultural and social influences on pain and pain management  - Notify physician/advanced practitioner if interventions unsuccessful or patient reports new pain  Outcome: Adequate for Discharge     Problem: INFECTION - ADULT  Goal: Absence or prevention of progression during hospitalization  Description: INTERVENTIONS:  - Assess and monitor for signs and symptoms of infection  - Monitor lab/diagnostic results  - Monitor all insertion sites, i e  indwelling lines, tubes, and drains  - Monitor endotracheal if appropriate and nasal secretions for changes in amount and color  - [place] appropriate cooling/warming therapies per order  - Administer medications as ordered  - Instruct and encourage patient and family to use good hand hygiene technique  - Identify and instruct in appropriate isolation precautions for identified infection/condition  Outcome: Adequate for Discharge     Problem: DISCHARGE PLANNING  Goal: Discharge to home or other facility with appropriate resources  Description: INTERVENTIONS:  - Identify barriers to discharge w/patient and caregiver  - Arrange for needed discharge resources and transportation as appropriate  - Identify discharge learning needs (meds, wound care, etc )  - Arrange for interpretive services to assist at discharge as needed  - Refer to Case Management Department for coordinating discharge planning if the patient needs post-hospital services based on physician/advanced practitioner order or complex needs related to functional status, cognitive ability, or social support system  Outcome: Adequate for Discharge     Problem: Knowledge Deficit  Goal: Patient/family/caregiver demonstrates understanding of disease process, treatment plan, medications, and discharge instructions  Description: Complete learning assessment and assess knowledge base    Interventions:  - Provide teaching at level of understanding  - Provide teaching via preferred learning methods  Outcome: Adequate for Discharge

## 2022-03-13 NOTE — NURSING NOTE
Patient IV removed with catheter tip intact, DSD and pressure applied  AVS reviewed with the patient, and his mother, who both expressed understanding

## 2022-03-13 NOTE — DISCHARGE INSTRUCTIONS
DISCHARGE INSTRUCTIONS:    FOLLOW UP APPOINTMENT: Following discharge from the hospital call the office in the next 1-2 days to set up a post-operative appointment to be seen in 2 weeks by Dr Dajuan Farah: Following discharge from the hospital, you may have some questions about your procedure, your activities or your general condition  You can expect to be sore and tender mostly around the incisions  This pain will last a few days and should gradually improve  INCISION SITES:  - You may apply ice to the incisions to help with pain  - It is normal to have some bruising, swelling or mild discoloration around the incision  If increasing redness or pain develops, call our office immediately    -Do not apply any creams, lotions, or ointments  If you have dressings:  - You may remove the gauze dressing from your incisions 48 hours after surgery  Do not pull or pick at these, they will fall off on their own  If you have surgical adhesive glue:  - The incisions are closed with dissolvable sutures underneath the skin and a surgical adhesive glue overlying the skin  - Do not pick at the adhesive  It will naturally wear off with time  WOUND CARE:  -Avoid tight adhering, irritative clothing   -You may gently shower, let water and soap run down and pat dry; do not scrub the incision sites    -No baths, hot tubs, or swimming x 6 weeks or until cleared after follow up appointment  PAIN CONTROL/MEDICATIONS:  -Recommend taking over the counter pain medication such as Tylenol OR Ibuprofen first; read and follow labels on bottles  -Only use prescribed pain medications as needed and try to taper down use overtime  Do not drive or operate heavy machinery while on prescription pain medications   Do not take with alcohol   - If you were given a prescription for Percocet, Norco, or Vicodin for pain be sure to eat prior to taking as these medications as they may cause nausea and vomiting on an empty stomach  -DO NOT take Tylenol  (acetaminophen) with prescribed pain medication for a fever or for further pain control as these medications already contain Tylenol in them  Do not exceed more than 4000 mg of acetaminophen in 24 hours or 3000 mg if you have liver disease    -You may apply ice at the incision site as needed for 20-30 minutes on/off to decrease pain or swelling  DIET/LIFE HABITS:  -Advance diet as tolerated  Start with bland foods  Once tolerating normal diet, include fiber-rich foods such as fruits and vegetables to help with bowel movements   -Stay hydrated  Drink plenty of non-caffienated, non-alcoholic beverages such as water, juices, popsicles, etc   -Abstain from drinking alcohol as this can interrupt wound healing and increase chance of unwanted bleeding    -No smoking at least 2 weeks post surgery, this can delay wound healing  Smoking cessation is encouraged and we can provide you with options to facilitate cessation  ACTIVITY/RESTRICTIONS:  -No strenuous exercise and no heavy lifting, pulling, or pushing >10-15 lbs x 4 weeks or until cleared by surgeon   -The evening following the procedure you should rest as much as possible, sitting, lying or reclining  You should be sure someone remains with you until the next morning  Gradually increase your activity daily  Walking 3-4 times daily is good and stairs are ok  Listen to your body  If you start to get tired or sore then rest    -No driving for while taking prescription pain medication        RETURN TO WORK:  -You may return to work or other activities as soon as your pain is controlled and you feel comfortable  For many people, this is 5 to 7 days after surgery  If your job requires heavy lifting you will need to be on light duty for 2-3 weeks       CALL THE OFFICE IF/RETURN TO ED:  -Fevers/chills with uncontrolled temperature > 100 4 F   -Increasing severe pain uncontrolled with pain medicine   -Incision site is having thick, yellow drainage, increasing redness, is warm to the touch, or becoming increasingly tender   -Unexplained bleeding   -Any changes in overall ines such as: nausea/vomitting, fevers/chills, diarrhea/constipation, inability/difficulty urinating, chest pains, palpations, trouble breathing, coughing, excessive fatigue/weakness, etc       Laparoscopic Appendectomy   WHAT YOU NEED TO KNOW:   Laparoscopic appendectomy is surgery to remove your appendix  The surgery is done through small incisions in your abdomen  Gas used during surgery may cause shoulder or chest pain  This is normal and should go away in a day or two  DISCHARGE INSTRUCTIONS:   Call your doctor or surgeon if:   · Blood soaks through your bandage  · You feel full and cannot burp or vomit  · You cannot eat or drink anything  · You have increasing swelling in your abdomen  · You have a foul-smelling odor coming from an incision wound, or it is draining fluid  · Your wound is red, swollen, or has pus  · Your vomit is greenish, looks like coffee grounds, or has blood in it  · You are not able to have a bowel movement  · You have a fever  · You have chills, a cough, or feel weak and achy  · You have nausea or are vomiting  · Your pain is not helped with medicine  · You have questions or concerns about your surgery, condition, or care  Self-care:   · Rest as needed to help with healing  You will need to go slowly and rest often  This will help protect the incision wounds  Do not lift anything heavier than your healthcare provider says is okay  Ask your provider when it is okay to drive and do your other normal daily activities  · Apply ice on your incision wounds  Ice helps prevent tissue damage and decreases swelling and pain  Use an ice pack, or put crushed ice in a plastic bag  Cover it with a towel before you apply it to your skin  Apply ice for 15 to 20 minutes every hour or as directed      · Prevent or relieve constipation  Constipation may make you strain to have a bowel movement  The strain cause damage before you have healed  Drink extra liquids during the day  Eat high-fiber foods  High-fiber foods include fruits and vegetables, whole-grain breads and cereals, and cooked beans  Incision wound care:   · Wash your hands  before you care for the incision wounds  · Check the wounds each day  for signs of infection, such as swelling, redness, or pus  Keep the wounds clean and dry  Follow up with your doctor or surgeon as directed:  Write down your questions so you remember to ask them during your visits  © Copyright [employer] 2022 Information is for End User's use only and may not be sold, redistributed or otherwise used for commercial purposes  All illustrations and images included in CareNotes® are the copyrighted property of [employer]  or [employer]  The above information is an  only  It is not intended as medical advice for individual conditions or treatments  Talk to your doctor, nurse or pharmacist before following any medical regimen to see if it is safe and effective for you

## 2022-03-13 NOTE — DISCHARGE INSTR - AVS FIRST PAGE
Please schedule a post-operative appointment to be seen by Dr Antonio Mcfarland in 2 weeks  A referral has been placed to Urology, they should call you within a day or two to schedule an appointment   If you do not hear from their office, please call to schedule an appointment

## 2022-03-14 NOTE — UTILIZATION REVIEW
Observation Admission Authorization Request   NOTIFICATION OF OBSERVATION ADMISSION/OBSERVATION AUTHORIZATION REQUEST   SERVICING FACILITY:   [place] [address]  [address] [facility] [place], [address]  Tax ID: [id number]  NPI: [id number]  Place of Service: [address] [facility] Code: 22  CPT Code for Observation: CPT   CPT 56144     ATTENDING PROVIDER:  Attending Name and NPI#: Soy Erwin Md [[id number]]  Address: [address] [facility] [place] [address], [address]  Phone: [phone]     UTILIZATION REVIEW CONTACT:  Mellisa Dowd Utilization Review Supervisor  Network Utilization Review Department  Phone: [phone]  Fax [phone]  Email: [email or web address]     PHYSICIAN ADVISORY SERVICES:  FOR DVPV-TR-QIOH REVIEW - MEDICAL NECESSITY DENIAL  Phone: [phone]  Fax: [phone]  Email: [email or web address]     TYPE OF REQUEST:  Observation Status     ADMISSION INFORMATION:  ADMISSION DATE/TIME:   PATIENT DIAGNOSIS CODE/DESCRIPTION:  Appendicitis [K37]  Ureterocele [N28 89]  Acute appendicitis with localized peritonitis, without perforation, abscess, or gangrene [K35 30]  DISCHARGE DATE/TIME: 3/13/2022 10:39 AM   IMPORTANT INFORMATION:  Please contact the Mellisa Dowd directly with any questions or concerns regarding this request  Department voicemails are confidential     Send requests for admission clinical reviews, concurrent reviews, approvals, and administrative denials due to lack of clinical to fax [phone]

## 2022-03-24 ENCOUNTER — OFFICE VISIT (OUTPATIENT)
Dept: SURGERY | Facility: CLINIC | Age: 22
End: 2022-03-24

## 2022-03-24 VITALS
HEIGHT: 75 IN | DIASTOLIC BLOOD PRESSURE: 62 MMHG | TEMPERATURE: 98.2 F | WEIGHT: 190 LBS | BODY MASS INDEX: 23.62 KG/M2 | RESPIRATION RATE: 16 BRPM | OXYGEN SATURATION: 99 % | HEART RATE: 96 BPM | SYSTOLIC BLOOD PRESSURE: 108 MMHG

## 2022-03-24 DIAGNOSIS — Z90.49 S/P LAPAROSCOPIC APPENDECTOMY: Primary | ICD-10-CM

## 2022-03-24 DIAGNOSIS — N28.89 URETEROCELE: ICD-10-CM

## 2022-03-24 DIAGNOSIS — R31.9 HEMATURIA: ICD-10-CM

## 2022-03-24 PROBLEM — K35.80 ACUTE APPENDICITIS: Status: RESOLVED | Noted: 2022-03-11 | Resolved: 2022-03-24

## 2022-03-24 PROCEDURE — 99024 POSTOP FOLLOW-UP VISIT: CPT | Performed by: SURGERY

## 2022-03-24 NOTE — LETTER
March 24, 2022     Patient: Shanta Conteh   YOB: 2000   Date of Visit: 3/24/2022       To Whom it May Concern:    Dorian Mane is under my professional care  He was seen in my office on 3/24/2022  Please excuse him from his previous obligations that were scheduled for today  If you have any questions or concerns, please don't hesitate to call           Sincerely,          Edin Killian MD        CC: No Recipients

## 2022-03-24 NOTE — LETTER
March 24, 2022     Patient: Camryn Servin   YOB: 2000   Date of Visit: 3/24/2022       To Whom it May Concern:    Karan Wadsworth is under my professional care  He was seen in my office on 3/24/2022  He had a surgical procedure on 3/12/2022  He will require an additional 2 months of recovery in terms of avoiding significant heavy lifting and straining that would stress his core  He can do cardiorespiratory exercise and also do arm and leg exercises  He should avoid significant lifting, bending, straining that would put a lot of pressure on the core and strain on the abdominal incisions as they heal  After mid May 2022, he can then return to full activity without restriction  If you have any questions or concerns, please don't hesitate to call           Sincerely,          Kaylene Taveras MD        CC: No Recipients

## 2022-03-24 NOTE — PROGRESS NOTES
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 that was incidentally noted on his CT imaging  I have advised nonurgent follow up with urology considering his symptoms that week were more concerning for a  process and that the mild inflammatory changes on the CT in the region of the appendix were almost an incidental finding  The patient will call if he has any new signs or symptoms in the coming weeks that are of concern, he will also follow up with his PCP  I have given him a note for school and a note regarding his return to activity restrictions  He will follow up here on an as needed basis  Subjective      Chief Complaint   Patient presents with    Post-op     Doing well, eating, drinking without issue  No nausea/vomiting  No obstipation or constipation  No fevers or chills  Minimal abdominal soreness  Returning to activity but avoiding heavy lifting and straining as instructed  No recurrent hematuria or right sided back pain  No urinary symptoms at all  Has not seen his PCP since discharge  Review of Systems   Constitutional: Positive for activity change  Negative for appetite change, chills, diaphoresis, fatigue and fever  Gastrointestinal: Positive for abdominal pain  Negative for abdominal distention, blood in stool, constipation, diarrhea, nausea and vomiting  Genitourinary: Negative for dysuria, hematuria and urgency  Musculoskeletal: Negative for back pain     Skin: Positive for wound  All other systems reviewed and are negative  The following portions of the patient's history were reviewed and updated as appropriate: allergies, current medications, past family history, past medical history, past social history, past surgical history and problem list     Objective      Blood pressure 108/62, pulse 96, temperature 98 2 °F (36 8 °C), temperature source Tympanic, resp  rate 16, height 6' 3" (1 905 m), weight 86 2 kg (190 lb), SpO2 99 %  Physical Exam  Vitals reviewed  Constitutional:       Appearance: Normal appearance  He is not ill-appearing  HENT:      Head: Normocephalic and atraumatic  Mouth/Throat:      Mouth: Mucous membranes are moist    Eyes:      Extraocular Movements: Extraocular movements intact  Pupils: Pupils are equal, round, and reactive to light  Cardiovascular:      Rate and Rhythm: Normal rate  Pulmonary:      Effort: Pulmonary effort is normal  No respiratory distress  Abdominal:      General: Abdomen is flat  There is no distension  Palpations: Abdomen is soft  There is no mass  Tenderness: There is no abdominal tenderness  There is no guarding or rebound  Hernia: No hernia is present  Comments: Incisions healing well, expected ecchymoses surrounding incision sites   Skin:     General: Skin is warm and dry  Coloration: Skin is not jaundiced  Neurological:      General: No focal deficit present  Mental Status: He is alert and oriented to person, place, and time  Psychiatric:         Mood and Affect: Mood normal          Behavior: Behavior normal          Signature:   Elsy Berry MD  Date: 3/24/2022 Time: 4:49 PM

## 2022-03-30 ENCOUNTER — TELEMEDICINE (OUTPATIENT)
Dept: BEHAVIORAL/MENTAL HEALTH CLINIC | Facility: CLINIC | Age: 22
End: 2022-03-30
Payer: COMMERCIAL

## 2022-03-30 DIAGNOSIS — F63.81 INTERMITTENT EXPLOSIVE DISORDER IN ADULT: ICD-10-CM

## 2022-03-30 DIAGNOSIS — F41.1 GENERALIZED ANXIETY DISORDER: Primary | ICD-10-CM

## 2022-03-30 DIAGNOSIS — F42.2 MIXED OBSESSIONAL THOUGHTS AND ACTS: ICD-10-CM

## 2022-03-30 PROCEDURE — 90834 PSYTX W PT 45 MINUTES: CPT | Performed by: SOCIAL WORKER

## 2022-03-30 NOTE — PSYCH
Virtual Regular Visit    Verification of patient location:    Patient is located in the following state in which I hold an active license [place]      Assessment/Plan:    Problem List Items Addressed This Visit        Other    Intermittent explosive disorder in adult    Generalized anxiety disorder - Primary    Mixed obsessional thoughts and acts          Goals addressed in session: Goal 1 and Goal 2          Reason for visit is   Chief Complaint   Patient presents with    Virtual Regular Visit        Encounter provider Jose E Robb    Provider located at [address] [place] [id number] [phone]      Recent Visits  No visits were found meeting these conditions  Showing recent visits within past 7 days and meeting all other requirements  Future Appointments  No visits were found meeting these conditions  Showing future appointments within next 150 days and meeting all other requirements       The patient was identified by name and date of birth  Ria Ruffin was informed that this is a telemedicine visit and that the visit is being conducted through[employer] and patient was informed this is a secure, HIPAA-complaint platform  He agrees to proceed     My office door was closed  No one else was in the room  He acknowledged consent and understanding of privacy and security of the video platform  The patient has agreed to participate and understands they can discontinue the visit at any time  Patient is aware this is a billable service  Barb Arcos is a 25 y o  male          HPI     Past Medical History:   Diagnosis Date    Acute appendicitis 3/11/2022    Allergic     enviromental    Asthma     Concussion     Sprain and strain of left wrist        Past Surgical History:   Procedure Laterality Date    APPENDECTOMY  03/12/2022    APPENDECTOMY LAPAROSCOPIC N/A 3/12/2022    Procedure: APPENDECTOMY LAPAROSCOPIC;  Surgeon: Michael Ronquillo MD;  Location: [facility];  Service: General    LYMPH NODE DISSECTION  2010    MYRINGOTOMY W/ TUBES         Current Outpatient Medications   Medication Sig Dispense Refill    ADVAIR HFA 45-21 MCG/ACT inhaler    11    albuterol (PROVENTIL HFA,VENTOLIN HFA) 90 mcg/act inhaler Inhale 1 puff every 6 (six) hours 1 Inhaler 0    cetirizine-pseudoephedrine (ZyrTEC-D) 5-120 MG per tablet Take 1 tablet by mouth 2 (two) times a day as needed        FLUoxetine (PROzac) 10 mg capsule TAKE 1 CAPSULE PER DAY IN ADDITION TO 20MG CAPSULE FOR TOTAL OF 30MG PER DAY  90 capsule 1    FLUoxetine (PROzac) 20 mg capsule TAKE 1 CAPSULE BY MOUTH EVERY DAY 90 capsule 0    fluticasone (FLONASE) 50 mcg/act nasal spray into each nostril 2 (two) times a day as needed        hydrOXYzine HCL (ATARAX) 25 mg tablet Take 1 tablet (25 mg total) by mouth 3 (three) times a day as needed for anxiety 60 tablet 0    ipratropium-albuterol (DUO-NEB) 0 5-2 5 mg/3 mL Inhale      levalbuterol (XOPENEX HFA) 45 mcg/act inhaler Inhale 1-2 puffs every 4 (four) hours as needed for wheezing   Multiple Vitamin (MULTI-DAY VITAMINS PO) daily      ondansetron (ZOFRAN) 4 mg tablet Take 1 tablet (4 mg total) by mouth every 6 (six) hours 12 tablet 0    Riboflavin (VITAMIN B-2) 100 MG TABS TAKE 1 TABLET BY MOUTH TWICE DAILY  0     No current facility-administered medications for this visit  No Known Allergies    Review of Systems    Video Exam    There were no vitals filed for this visit  Physical Exam     I spent 45 minutes directly with the patient during this visit     Psychotherapy Provided: Individual Psychotherapy 45 minutes     Length of time in session: 9:00 am to 9:45 am, follow up in 2 week    Encounter Diagnosis     ICD-10-CM    1  Generalized anxiety disorder  F41 1    2  Mixed obsessional thoughts and acts  F42 2    3   Intermittent explosive disorder in adult  F63 81        Goals addressed in session: Goal 1 and Goal 2     Pain:      moderate    6    Current suicide risk : Low     Therapist met with Rene Cornelius and therapist discussed his recent hospital admission and recovery from surgery  He noted that he continues to recover and is focusing on completing homework and missed assignments  Therapist and Rene Cornelius also discussed upcoming events that he was looking forward to  He seems to be in good spirits and was in a positive mood  He will continue this through this review  Behavioral Health Treatment Plan [facility]: Diagnosis and Treatment Plan explained to Marcelina Martinez relates understanding diagnosis and is agreeable to Treatment Plan  Yes     VIRTUAL VISIT [address] [place] [address] verbally agrees to participate in [facility]  Pt is aware that [facility] could be limited without vital signs or the ability to perform a full hands-on physical Pascual Husbands understands he or the provider may request at any time to terminate the video visit and request the patient to seek care or treatment in person

## 2022-03-31 ENCOUNTER — TELEPHONE (OUTPATIENT)
Dept: SURGERY | Facility: CLINIC | Age: 22
End: 2022-03-31

## 2022-03-31 NOTE — TELEPHONE ENCOUNTER
Placed call to patient's PCP and advised them of the referral placed for urology for congenital ureterocele (discovered on preop imaging) and hematuria (now resolved)   They requested his office note to be faxed to them, visit was faxed to them  No more questions at this time

## 2022-04-04 ENCOUNTER — TELEPHONE (OUTPATIENT)
Dept: SURGERY | Facility: CLINIC | Age: 22
End: 2022-04-04

## 2022-04-04 NOTE — TELEPHONE ENCOUNTER
Yasir Zuleta called, he said that he needs more of an explanation in the letter for his school as to what he can and can't do since he is post op (Lap appy 03/12/2022) and for how long  He said the letter can be emailed to [email or web address]  COM

## 2022-04-04 NOTE — TELEPHONE ENCOUNTER
A new letter was placed with restrictions at the visit, I printed that one and emailed to requested email

## 2022-04-27 ENCOUNTER — TELEPHONE (OUTPATIENT)
Dept: PSYCHIATRY | Facility: CLINIC | Age: 22
End: 2022-04-27

## 2022-05-13 ENCOUNTER — TELEMEDICINE (OUTPATIENT)
Dept: PSYCHIATRY | Facility: CLINIC | Age: 22
End: 2022-05-13
Payer: COMMERCIAL

## 2022-05-13 DIAGNOSIS — F63.81 INTERMITTENT EXPLOSIVE DISORDER IN ADULT: ICD-10-CM

## 2022-05-13 DIAGNOSIS — Z79.899 MEDICAL CANNABIS USE: ICD-10-CM

## 2022-05-13 DIAGNOSIS — F42.2 MIXED OBSESSIONAL THOUGHTS AND ACTS: ICD-10-CM

## 2022-05-13 DIAGNOSIS — F41.1 GENERALIZED ANXIETY DISORDER: Primary | ICD-10-CM

## 2022-05-13 PROCEDURE — 99213 OFFICE O/P EST LOW 20 MIN: CPT | Performed by: STUDENT IN AN ORGANIZED HEALTH CARE EDUCATION/TRAINING PROGRAM

## 2022-05-13 RX ORDER — FLUOXETINE HYDROCHLORIDE 40 MG/1
40 CAPSULE ORAL DAILY
Qty: 90 CAPSULE | Refills: 2 | Status: SHIPPED | OUTPATIENT
Start: 2022-05-13

## 2022-05-13 NOTE — PSYCH
MEDICATION MANAGEMENT NOTE        [facility]      Name and Date of Birth:  Mohit Razo 25 y o  2000 MRN: [id number]    Date of Visit: May 13, 2022    Reason for Visit: Follow-up visit for medication management     Virtual Visit Disclaimer:       TeleMed provider: Blanca Piper MD    Location: at [facility] [address] in [place], [place], [address]    Verification of patient location:     Patient is currently located in the [facility]  Patient is currently located in a state in which I am licensed     After connecting through Minka, the patient was identified by name and date of birth  Mohit Razo was informed that this is a telemedicine visit that is being conducted through [address] [place] [address] Now, and the patient was informed that this is a secure, HIPAA-compliant platform  My office door was closed  No one else was in the room  Mohit Razo acknowledged consent and understanding of privacy and security of the video platform  Liu Vanegas understands that the online visit is based solely on information provided by the patient, and that, in the absence of a face-to-face physical evaluation by the physician, the diagnosis Liu Vanegas  receives is both limited and provisional in terms of accuracy and completeness  Mohit Razo understands that they can discontinue the visit at any time  I informed Liu Vanegas that I have reviewed their record in EPIC and presented the opportunity for them to ask any questions regarding the visit today  Mohit Razo voiced understanding and consented to these terms  Liu Vanegas is aware this is a billable service      SUBJECTIVE:    Mohit Razo is a 25 y o  male with past psychiatric history significant for generalized anxiety disorder, OCD, intermittent explosive disorder, insomnia and cannabis use (medical and recreational) who was personally seen and evaluated today at the [address] [place] [address] [address] outpatient clinic for follow-up and medication management  Trever Gaytan presents as calm, pleasant, and cooperative  His thoughts are organized and linear  He completes assessment without difficulty  Trever Gaytan endorses compliance with Prozac daily  He states that he has been taking 40mg Daily (was prescribed 30mg following last visit) as the pharmacy near school did not carry 10mg tablets  He denies any current adverse side effects  Trever Gaytan reports current mood stability  He describes his mood as "pretty good"  He finished the academic semester without incident  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[employer] and is planning to obtain a second summer job because he is subjectively "money hungry"  Trever Gaytan does not endorse SI/HI  He remains future-oriented and demonstrates self preservation  He continues to benefit from individual therapy  Trever Gaytan reports healthy appetite and improved sleep pattern since taking 40mg of Prozac daily  His energy fluctuates at times, depending on the amount of cannabis he consumes  Trever Gaytan is without crying spells, anhedonia, or hopelessness  He is pleased to report that he has been without significant mood reactivity or irritability since last visit  He denies mood swings, aggressiveness, or agitation  He is bright and jocular today  Trever Gaytan endorses episodic worry about his physical health as he was hospitalized in March secondary to need an emergent appendectomy  He has recovered without significant pain  Trever Gaytan denies overwhelming nervousness or anxiety at the moment  He is without panic symptoms  During today's examination, Trever Gaytan does not exhibit objective evidence of nilda/hypomania or navin psychosis  Trever Gaytan is not currently grandiose, labile, or pathologically euphoric  Trever Gaytan is without perceptual disturbances, such as A/V hallucinations, paranoia, ideas of reference, or delusional beliefs  Trever Gaytan denies recent ETOH abuse  He denies any further concerns  He is without changes in OCD symptomatology      Current Rating Scores: None completed today  Review Of Systems:      Constitutional fluctuating energy level and as noted in HPI   ENT negative   Cardiovascular negative   Respiratory negative   Gastrointestinal abdominal discomfort   Genitourinary negative   Musculoskeletal negative   Integumentary negative   Neurological negative   Endocrine negative   Other Symptoms none, all other systems are negative       Past Psychiatric History: (unchanged information from previous note copied and italicized) - Information that is bolded has been updated       Inpatient psychiatric admissions: Denies  Prior outpatient psychiatric linkage: Denies  Past/current psychotherapy: Currently linked with Juju Mcdowell (psychotherapy)   History of suicidal attempts/gestures: Denies  History of violence/aggressive behaviors: Yes, towards parents, peers and authoritative figures  Psychotropic medication trials: Paxil (D/C because of fatigue), Prozac (now), Hydroxyzine (now)  Substance abuse inpatient/outpatient rehabilitation: Denies     Substance Abuse History: (unchanged information from previous note copied and italicized) - Information that is bolded has been updated       No history of ETOH or tobacco abuse  However, patient does use cannabis daily  No past legal actions or arrests secondary to substance intoxication  The patient denies prior DWIs/DUIs  No history of outpatient/inpatient rehabilitation programs  Leonardo does not exhibit objective evidence of substance withdrawal during today's examination nor does Leonardo appear under the influence of any psychoactive substance           Social History: (unchanged information from previous note copied and italicized) - Information that is bolded has been updated       Developmental: Denies a history of milestone/developmental delay  Denies a history of in-utero exposure to toxins/illicit substances  There is no documented history of IEP or need for special education    Education: some college - currently enrolled at [address] [place] [address] [employer]   Marital history: single  Living arrangement, social support: parents  Occupational History: Works at local [employer] also is full-time student  Access to firearms: Denies direct access to weapons/firearms  Leonardo Montenegro has no history of arrests or violence with a deadly weapon       Traumatic History: (unchanged information from previous note copied and italicized) - Information that is bolded has been updated       Abuse:none is reported  Other Traumatic Events: Denies        Past Medical History:    Past Medical History:   Diagnosis Date    Acute appendicitis 3/11/2022    Allergic     enviromental    Asthma     Concussion     Sprain and strain of left wrist      Past Medical History Pertinent Negatives:   Diagnosis Date Noted    Arthritis 03/12/2022    CHF (congestive heart failure) ([facility] ) 03/12/2022    COPD (chronic obstructive pulmonary disease) ([place] [address] ) 03/12/2022    Coronary artery disease 03/12/2022    Diabetes mellitus ([place] [address] ) 03/12/2022    Disease of thyroid gland 03/12/2022    History of transfusion 03/12/2022    Hypertension 03/12/2022    Pressure injury of skin 03/12/2022    Psychiatric disorder 03/12/2022    Renal disorder 03/12/2022    Stroke ([place] [address] ) 03/12/2022    TIA (transient ischemic attack) 03/12/2022     Past Surgical History:   Procedure Laterality Date    APPENDECTOMY  03/12/2022    APPENDECTOMY LAPAROSCOPIC N/A 3/12/2022    Procedure: APPENDECTOMY LAPAROSCOPIC;  Surgeon: Joel Huntley MD;  Location: [facility];  Service: General    LYMPH NODE DISSECTION  2010    MYRINGOTOMY W/ TUBES       No Known Allergies    Substance Abuse History:    Social History     Substance and Sexual Activity   Alcohol Use Never     Social History     Substance and Sexual Activity   Drug Use Never       Social History:    Social History     Socioeconomic History    Marital status: Single     Spouse name: Not on file   Cathy Caceres Number of children: Not on file    Years of education: Not on file    Highest education level: Not on file   Occupational History    Not on file   Tobacco Use    Smoking status: Never Smoker    Smokeless tobacco: Never Used   Vaping Use    Vaping Use: Some days    Substances: THC, CBD   Substance and Sexual Activity    Alcohol use: Never    Drug use: Never    Sexual activity: Not Currently     Partners: Female   Other Topics Concern    Not on file   Social History Narrative    Not on file     Social Determinants of Health     Financial Resource Strain: Not on file   Food Insecurity: Not on file   Transportation Needs: Not on file   Physical Activity: Not on file   Stress: Not on file   Social Connections: Not on file   Intimate Partner Violence: Not on file   Housing Stability: Not on file       Family Psychiatric History:     Family History   Problem Relation Age of Onset    No Known Problems Father        History Review: The following portions of the patient's history were reviewed and updated as appropriate: allergies, current medications, past family history, past medical history, past social history, past surgical history and problem list          OBJECTIVE:     Vital signs in last 24 hours: There were no vitals filed for this visit      Mental Status Evaluation:    Appearance looks stated age, shirtless   Behavior pleasant, cooperative, calm   Speech normal rate, normal volume, normal pitch, clear   Mood euthymic   Affect normal range and intensity, appropriate   Thought Processes organized, logical, goal directed, normal rate of thoughts, normal abstract reasoning   Associations intact associations   Thought Content no overt delusions   Perceptual Disturbances: no auditory hallucinations, no visual hallucinations   Abnormal Thoughts  Risk Potential Suicidal ideation - None at present  Homicidal ideation - None at present  Potential for aggression - No   Orientation oriented to person, place, time/date and situation   Memory recent and remote memory grossly intact   Consciousness alert and awake   Attention Span Concentration Span attention span and concentration are age appropriate   Intellect appears to be of average intelligence   Insight intact and good   Judgement intact and good   Muscle Strength and  Gait unable to assess today due to virtual visit   Motor activity no abnormal movements   Language no difficulty naming common objects   Fund of Knowledge adequate knowledge of current events   Pain mild   Pain Scale did not rate       Laboratory Results: I have personally reviewed all pertinent laboratory/tests results    Recent Labs (last 4 months):    Admission on 03/11/2022, Discharged on 03/13/2022   Component Date Value    Sodium 03/12/2022 138     Potassium 03/12/2022 3 9     Chloride 03/12/2022 105     CO2 03/12/2022 28     ANION GAP 03/12/2022 5     BUN 03/12/2022 12     Creatinine 03/12/2022 1 19     Glucose 03/12/2022 85     Glucose, Fasting 03/12/2022 85     Calcium 03/12/2022 9 1     eGFR 03/12/2022 88     Magnesium 03/12/2022 2 1     Phosphorus 03/12/2022 3 8     WBC 03/12/2022 5 50     RBC 03/12/2022 4 99     Hemoglobin 03/12/2022 15 1     Hematocrit 03/12/2022 44 5     MCV 03/12/2022 89     MCH 03/12/2022 30 3     MCHC 03/12/2022 33 9     RDW 03/12/2022 12 8     MPV 03/12/2022 8 9     Platelets 99/23/7741 216     nRBC 03/12/2022 0     Neutrophils Relative 03/12/2022 62     Immat GRANS % 03/12/2022 0     Lymphocytes Relative 03/12/2022 27     Monocytes Relative 03/12/2022 9     Eosinophils Relative 03/12/2022 2     Basophils Relative 03/12/2022 0     Neutrophils Absolute 03/12/2022 3 39     Immature Grans Absolute 03/12/2022 0 01     Lymphocytes Absolute 03/12/2022 1 46     Monocytes Absolute 03/12/2022 0 51     Eosinophils Absolute 03/12/2022 0 11     Basophils Absolute 03/12/2022 0 02     Protime 03/12/2022 13 3     INR 03/12/2022 1 02     PTT 03/12/2022 28     ABO Grouping 03/12/2022 A     Rh Factor 03/12/2022 Positive     Antibody Screen 03/12/2022 Negative     Specimen Expiration Date 03/12/2022 [id number]     Case Report 03/12/2022                      Value:Surgical Pathology Report                         Case: [id number]                                   Authorizing Provider:  Yousif Nevarez MD           Collected:           03/12/2022 0915              Ordering Location:     [facility] Received:            03/12/2022 1002                                     Operating Room                                                               Pathologist:           Alexx Yoon DO                                                     Specimen:    Appendix, Appendix and contents                                                            Final Diagnosis 03/12/2022                      Value: This result contains rich text formatting which cannot be displayed here   Note 03/12/2022                      Value: This result contains rich text formatting which cannot be displayed here   Additional Information 03/12/2022                      Value: This result contains rich text formatting which cannot be displayed here  [facility] Gross Description 03/12/2022                      Value: This result contains rich text formatting which cannot be displayed here     Admission on 03/11/2022, Discharged on 03/11/2022   Component Date Value    WBC 03/11/2022 6 00     RBC 03/11/2022 4 99     Hemoglobin 03/11/2022 15 1     Hematocrit 03/11/2022 43 2     MCV 03/11/2022 87     MCH 03/11/2022 30 3     MCHC 03/11/2022 35 0     RDW 03/11/2022 12 7     MPV 03/11/2022 8 9     Platelets 27/19/4935 254     nRBC 03/11/2022 0     Neutrophils Relative 03/11/2022 63     Immat GRANS % 03/11/2022 0     Lymphocytes Relative 03/11/2022 27     Monocytes Relative 03/11/2022 8     Eosinophils Relative 03/11/2022 1     Basophils Relative 03/11/2022 1     Neutrophils Absolute 03/11/2022 3 80     Immature Grans Absolute 03/11/2022 0 02     Lymphocytes Absolute 03/11/2022 1 61     Monocytes Absolute 03/11/2022 0 47     Eosinophils Absolute 03/11/2022 0 06     Basophils Absolute 03/11/2022 0 04     Sodium 03/11/2022 139     Potassium 03/11/2022 3 6     Chloride 03/11/2022 103     CO2 03/11/2022 26     ANION GAP 03/11/2022 10     BUN 03/11/2022 11     Creatinine 03/11/2022 1 00     Glucose 03/11/2022 88     Calcium 03/11/2022 9 0     AST 03/11/2022 17     ALT 03/11/2022 30     Alkaline Phosphatase 03/11/2022 75     Total Protein 03/11/2022 7 2     Albumin 03/11/2022 4 0     Total Bilirubin 03/11/2022 0 65     eGFR 03/11/2022 108     Lipase 03/11/2022 52 (A)    Color, UA 03/11/2022 Yellow     Clarity, UA 03/11/2022 Clear     Specific Gravity, UA 03/11/2022 <=1 005     pH, UA 03/11/2022 6 0     Leukocytes, UA 03/11/2022 Negative     Nitrite, UA 03/11/2022 Negative     Protein, UA 03/11/2022 Negative     Glucose, UA 03/11/2022 Negative     Ketones, UA 03/11/2022 Negative     Urobilinogen, UA 03/11/2022 0 2     Bilirubin, UA 03/11/2022 Negative     Blood, UA 03/11/2022 Negative    Transcribe Orders on 03/04/2022   Component Date Value    Color, UA 03/04/2022 Light Yellow     Clarity, UA 03/04/2022 Clear     Specific Gravity, UA 03/04/2022 1 024     pH, UA 03/04/2022 6 0     Leukocytes, UA 03/04/2022 Negative     Nitrite, UA 03/04/2022 Negative     Protein, UA 03/04/2022 Negative     Glucose, UA 03/04/2022 Negative     Ketones, UA 03/04/2022 Negative     Urobilinogen, UA 03/04/2022 <2 0     Bilirubin, UA 03/04/2022 Negative     Blood, UA 03/04/2022 Negative     Urine Culture 03/04/2022 No Growth <1000 cfu/mL        Suicide/Homicide Risk Assessment:    The following interventions are recommended: no intervention changes needed      Lethality Statement:    Based on today's assessment and clinical criteria, Katy vicentes for safety and is not an imminent risk of harm to self or others  Outpatient level of care is deemed appropriate at this current time  Lindenjuan Wellington understands that if they can no longer contract for safety, they need to call the office or report to their nearest Emergency Room for immediate evaluation  Assessment/Plan:     Katt Montenegro is a 25 y  o  male with past psychiatric history significant for generalized anxiety disorder, OCD, intermittent explosive disorder, insomnia and cannabis use (medical and recreational) who was evaluated today at the [address] [place] [address] E outpatient clinic for follow-up and medication management  Leonardo is currently linked with Jia Olivas for individualized psychotherapy of which he admits to be beneficial  Linden Wellington states that he has a longstanding history of intermittent explosive disorder and oppositionality  In an impressionistic manner, he is braggadocios regarding past events in which he verbally attacked his parents, peers, and authoritative figures (like teachers)  He speaks at length about "getting money" and how he only cares about "his fam, marijuana, and cash"  A review of documentation reveals a past history of anger outbursts, vindictiveness, mood lability which appears purposeful  Lindenjuan Wellington is proud to share that he often "puts people in their place" when confronted  He hypothesizes this is secondary to "nilda" and hints at a diagnostic history of nilda/hypomania  However, upon extensive questioning, Linden Wellington has no diagnostic indicators suggestive of an underlying bipolar chemistry  He is able to act appropriately at work ([employer]) and has never been suspended or expelled from school in the past, suggestive of his ability to "switch it on and off" willfully  Linden Wellington admits to daily cannabis abuse, stating "it's the only thing" that manages his anxiety  He states that his excessive worry and nervousness began in HS and has intensified over the last few years   He has no prior psychotropic medication trials but states that therapy has been helpful  During intake assessment, he laughed and appeared at ease, however, he completed a HAVEN-7 questionnaire which resulted in a score of 18  His answers and inflammatory statements are incongruent with his affect  During intake, Leonardo endorsed several neurovegetative symptoms of depression, such as poor sleep and limited energy  Mame Smith speaks at length about his "rule of 6's" today, stating that he often has " 6 thoughts" in his mind at all times  He perseverates on the need for even numbers and is rigid and inflexible when "things don't go his way"  He states that he experiences intrusive and persistent thoughts and behaves in a manner to neutralize these thoughts  He finds that he only eats even number foods (ie 4 or 6 fries at one time) and will have to touch a crack in a sidewalk with his left food if he touched it with his right foot  He denies any signs/symptoms suggestive of PTSD or disordered eating       Today's Plan:    Psychopharmacologically, Leonardo reports tolerability and benefit from Prozac 40mg Daily   He is without lethality concern and denies need for medication intervention at this juncture         DSM-V Diagnoses:      1 ) Generalized Anxiety Disorder  2 ) OCD  3 ) Intermittent Explosive Disorder               -R/O ODD  4 ) Cannabis Use (medical and recreational)  5 ) Insomnia   6 ) Alcohol abuse - resolved        Treatment Recommendations/Precautions:      1 ) Generalized Anxiety Disorder  - Continue Prozac 40mg Dailly  - Discontinue Hydroxyzine 25mg TID PRN secondary to limited use and fatigue  - Continue engagement in psychotherapy with Nahun Tian  - Psychoeducation provided regarding the importance of exercise and healthy dietary choices and their impact on mood, energy, and motivation  - Counseled to avoid ETOH, illict substances, and nicotine secondary to the detrimental effects of these substances on mental and physical health  - Encouraged to engage in non-verbal forms of therapy such as art therapy, music therapy, and mindfulness        2 ) OCD  - Continue Prozac 40mg Dailly        3  ) Intermittent Explosive Disorder               -R/O ODD  - Continue Prozac 40mg Dailly  - Consider use of Lamictal in future         4 ) Cannabis Use (medical and recreational)  - Psychoeducation provided regarding the importance of exercise and healthy dietary choices and their impact on mood, energy, and motivation  - Counseled to avoid ETOH, illict substances, and nicotine secondary to the detrimental effects of these substances on mental and physical health  - Not willing to engage in sobriety at this juncture      5 ) Insomnia   - Discontinue Hydroxyzine 25mg TID PRN secondary to limited use and fatigu     6 ) Alcohol abuse- resolved   - Counseled to avoid ETOH, illict substances, and nicotine secondary to the detrimental effects of these substances on mental and physical health            Medication management every 4 months  Continue psychotherapy with [facility] therapist [address] of need to follow up with family physician for medical issues  Aware of 24 hour and weekend coverage for urgent situations accessed by calling [facility] main practice number    Medications Risks/Benefits      Risks, Benefits And Possible Side Effects Of Medications:    Risks, benefits, and possible side effects of medications explained to [facility] including risk of suicidality and serotonin syndrome related to treatment with antidepressants  He verbalizes understanding and agreement for treatment  Controlled Medication Discussion:     Not applicable    Psychotherapy Provided:     Individual psychotherapy provided: No     Treatment Plan:    Completed and signed during the session: Not applicable - Treatment Plan to be completed by [facility] therapist    Note Share Disclaimer:      This note was not shared with the patient due to reasonable likelihood of causing patient harm    Willy Kiser MD 05/13/22

## 2022-09-19 NOTE — PSYCH
MEDICATION MANAGEMENT NOTE        [facility]      Name and Date of Birth:  Melodie Skaggs 23 y o  2000 MRN: [id number]    Date of Visit: September 20, 2022    Reason for Visit: Follow-up visit for medication management     Virtual Visit Disclaimer:       TeleMed provider: Syl Smith MD    Location: at [facility] [address] in [place], [place], [address]    Verification of patient location:     Patient is currently located in the [facility]  Patient is currently located in a state in which I am licensed     After connecting through [employer], the patient was identified by name and date of birth  Melodie Skaggs was informed that this is a telemedicine visit that is being conducted through [address] [place] [address] Now, and the patient was informed that this is a secure, HIPAA-compliant platform  My office door was closed  No one else was in the room  Melodie Skaggs acknowledged consent and understanding of privacy and security of the video platform  Vladislav Thompson understands that the online visit is based solely on information provided by the patient, and that, in the absence of a face-to-face physical evaluation by the physician, the diagnosis Vladislav Thompson  receives is both limited and provisional in terms of accuracy and completeness  Melodie Skaggs understands that they can discontinue the visit at any time  I informed Vladislav Thompson that I have reviewed their record in EPIC and presented the opportunity for them to ask any questions regarding the visit today  Melodie Skaggs voiced understanding and consented to these terms  Vladislav Thompson is aware this is a billable service  Virtual visit start and stop times:     Start Time: 9:32     Stop Time: 9:52     I spent 20 minutes with patient today in which greater than 50% of the time was spent in counseling/coordination of care        SUBJECTIVE:    Melodie Skaggs is a 23 y o  male with past psychiatric history significant for generalized anxiety disorder, OCD, intermittent explosive disorder, insomnia and cannabis use (medical and recreational) who was personally seen and evaluated today at the [address] [place] [address] E outpatient clinic for follow-up and medication management  Alberto Foster presents as     Alberto Foster endorses compliance with psychotropic medication regimen consisting of Prozac 40mg Daily  He denies adverse medication side effects  Alberto Foster states that he volitionally discontinued Prozac in June secondary to "feeling physically sick"  He did not contact the clinic during this period  He reports worsening anxiety, irritability, and tension in August upon transitioning back to school and thus, resumed Prozac at 10mg Daily  He titrated the medication to 40mg Daily within the last 2 weeks and feels back to baseline  Alberto Foster endorses acute distress regarding school, in particular, his math class  He also reports distress related to his motorcycle and the cost to maintain this  However, he denies this stress is overtly consuming or debilitating  Alberto Foster reports periodic worry and nervousness that is not pathologic at the moment  He denies panic symptoms  He is not on-edge or restless today  He voices interest in returning to individual therapy as he has not seen his therapist since April  I will message her personally today  Alberto Foster currently denies neurovegetative symptoms of depression  His sleep, appetite, energy, and motivation are at baseline  He voices "excitement" regarding his language (chinese) class  Alberto Foster states that his relationship with his GF is going well  He denies SI/HI  He is without crying spells, anhedonia, or hopelessness  He remains future-oriented and demonstrates self preservation as evidenced by his commitment to medication and interest in returning to therapy  During today's examination, Alberto Foster does not exhibit objective evidence of nilda/hypomania or navin psychosis   Alberto Foster is not currently irritable, grandiose, labile, or pathologically euphoric  Lupis Rodriguez is without perceptual disturbances, such as A/V hallucinations, paranoia, ideas of reference, or delusional beliefs  Lupis Rodriguez denies recent ETOH or illicit substance abuse, aside from cannabis use  He offers no further concerns  Current Rating Scores:     None completed today  Review Of Systems:      Constitutional negative   ENT negative   Cardiovascular negative   Respiratory negative   Gastrointestinal negative   Genitourinary negative   Musculoskeletal negative   Integumentary negative   Neurological negative   Endocrine negative   Other Symptoms none, all other systems are negative       Past Psychiatric History: (unchanged information from previous note copied and italicized) - Information that is bolded has been updated       Inpatient psychiatric admissions: Denies  Prior outpatient psychiatric linkage: Denies  Past/current psychotherapy: Currently linked with Najma Mcfadden (psychotherapy)   History of suicidal attempts/gestures: Denies  History of violence/aggressive behaviors: Yes, towards parents, peers and authoritative figures  Psychotropic medication trials: Paxil (D/C because of fatigue), Prozac (now), Hydroxyzine (now)  Substance abuse inpatient/outpatient rehabilitation: Denies     Substance Abuse History: (unchanged information from previous note copied and italicized) - Information that is bolded has been updated       No history of ETOH or tobacco abuse  However, patient does use cannabis daily  No past legal actions or arrests secondary to substance intoxication  The patient denies prior DWIs/DUIs  No history of outpatient/inpatient rehabilitation programs  Leonardo does not exhibit objective evidence of substance withdrawal during today's examination nor does Leonardo appear under the influence of any psychoactive substance           Social History: (unchanged information from previous note copied and italicized) - Information that is bolded has been updated     Developmental: Denies a history of milestone/developmental delay  Denies a history of in-utero exposure to toxins/illicit substances  There is no documented history of IEP or need for special education    Education: some college - currently enrolled at [employer]   Marital history: single  Living arrangement, social support: parents  Occupational History: 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  Access to firearms: Denies direct access to weapons/firearms  Leonardo R Montenegro has no history of arrests or violence with a deadly weapon       Traumatic History: (unchanged information from previous note copied and italicized) - Information that is bolded has been updated    Sanchez Jovon is reported  Other Traumatic Events: Denies      Past Medical History:    Past Medical History:   Diagnosis Date    Acute appendicitis 3/11/2022    Allergic     enviromental    Asthma     Concussion     Sprain and strain of left wrist         Past Surgical History:   Procedure Laterality Date    APPENDECTOMY  03/12/2022    APPENDECTOMY LAPAROSCOPIC N/A 3/12/2022    Procedure: APPENDECTOMY LAPAROSCOPIC;  Surgeon: Niya Infante MD;  Location: [facility];  Service: General    LYMPH NODE DISSECTION  2010    MYRINGOTOMY W/ TUBES       No Known Allergies    Substance Abuse History:    Social History     Substance and Sexual Activity   Alcohol Use Never     Social History     Substance and Sexual Activity   Drug Use Never       Social History:    Social History     Socioeconomic History    Marital status: Single     Spouse name: Not on file    Number of children: Not on file    Years of education: Not on file    Highest education level: Not on file   Occupational History    Not on file   Tobacco Use    Smoking status: Never Smoker    Smokeless tobacco: Never Used   Vaping Use    Vaping Use: Some days    Substances: THC, CBD   Substance and Sexual Activity    Alcohol use: Never    Drug use: Never    Sexual activity: Not Currently     Partners: Female   Other Topics Concern    Not on file   Social History Narrative    Not on file     Social Determinants of Health     Financial Resource Strain: Not on file   Food Insecurity: Not on file   Transportation Needs: Not on file   Physical Activity: Not on file   Stress: Not on file   Social Connections: Not on file   Intimate Partner Violence: Not on file   Housing Stability: Not on file       Family Psychiatric History:     Family History   Problem Relation Age of Onset    No Known Problems Father        History Review: The following portions of the patient's history were reviewed and updated as appropriate: allergies, current medications, past family history, past medical history, past social history, past surgical history and problem list          OBJECTIVE:     Vital signs in last 24 hours: There were no vitals filed for this visit      Mental Status Evaluation:    Appearance age appropriate, casually dressed, dressed appropriately, looks stated age   Behavior pleasant, cooperative, calm, good eye contact   Speech normal rate, normal volume, normal pitch   Mood euthymic   Affect normal range and intensity, appropriate   Thought Processes organized, logical, goal directed, normal rate of thoughts, normal abstract reasoning   Associations intact associations   Thought Content no overt delusions   Perceptual Disturbances: no auditory hallucinations, no visual hallucinations   Abnormal Thoughts  Risk Potential Suicidal ideation - None at present  Homicidal ideation - None at present  Potential for aggression - No   Orientation oriented to person, place, time/date and situation   Memory recent and remote memory grossly intact   Consciousness alert and awake   Attention Span Concentration Span attention span and concentration are age appropriate   Intellect appears to be of average intelligence   Insight intact and good   Judgement intact and good   Muscle Strength and  Gait unable to assess today due to virtual visit   Motor activity no abnormal movements   Language no difficulty naming common objects   Fund of Knowledge adequate knowledge of current events   Pain none   Pain Scale 0       Laboratory Results: I have personally reviewed all pertinent laboratory/tests results    Recent Labs (last 4 months):   No visits with results within 4 Month(s) from this visit     Latest known visit with results is:   Admission on 03/11/2022, Discharged on 03/13/2022   Component Date Value    Sodium 03/12/2022 138     Potassium 03/12/2022 3 9     Chloride 03/12/2022 105     CO2 03/12/2022 28     ANION GAP 03/12/2022 5     BUN 03/12/2022 12     Creatinine 03/12/2022 1 19     Glucose 03/12/2022 85     Glucose, Fasting 03/12/2022 85     Calcium 03/12/2022 9 1     eGFR 03/12/2022 88     Magnesium 03/12/2022 2 1     Phosphorus 03/12/2022 3 8     WBC 03/12/2022 5 50     RBC 03/12/2022 4 99     Hemoglobin 03/12/2022 15 1     Hematocrit 03/12/2022 44 5     MCV 03/12/2022 89     MCH 03/12/2022 30 3     MCHC 03/12/2022 33 9     RDW 03/12/2022 12 8     MPV 03/12/2022 8 9     Platelets 87/81/0446 216     nRBC 03/12/2022 0     Neutrophils Relative 03/12/2022 62     Immat GRANS % 03/12/2022 0     Lymphocytes Relative 03/12/2022 27     Monocytes Relative 03/12/2022 9     Eosinophils Relative 03/12/2022 2     Basophils Relative 03/12/2022 0     Neutrophils Absolute 03/12/2022 3 39     Immature Grans Absolute 03/12/2022 0 01     Lymphocytes Absolute 03/12/2022 1 46     Monocytes Absolute 03/12/2022 0 51     Eosinophils Absolute 03/12/2022 0 11     Basophils Absolute 03/12/2022 0 02     Protime 03/12/2022 13 3     INR 03/12/2022 1 02     PTT 03/12/2022 28     ABO Grouping 03/12/2022 A     Rh Factor 03/12/2022 Positive     Antibody Screen 03/12/2022 Negative     Specimen Expiration Date 03/12/2022 [id number]     Case Report 03/12/2022 Value:Surgical Pathology Report                         Case: [id number]                                   Authorizing Provider:  Soy Erwin MD           Collected:           03/12/2022 0915              Ordering Location:     [facility] Received:            03/12/2022 1002                                     Operating Room                                                               Pathologist:           Jailyn Castellano DO                                                     Specimen:    Appendix, Appendix and contents                                                            Final Diagnosis 03/12/2022                      Value: This result contains rich text formatting which cannot be displayed here   Note 03/12/2022                      Value: This result contains rich text formatting which cannot be displayed here   Additional Information 03/12/2022                      Value: This result contains rich text formatting which cannot be displayed here  [facility] Description 03/12/2022                      Value: This result contains rich text formatting which cannot be displayed here  Suicide/Homicide Risk Assessment:    The following interventions are recommended: no intervention changes needed      Lethality Statement:    Based on today's assessment and clinical criteria, Mary Bermudez contracts for safety and is not an imminent risk of harm to self or others  Outpatient level of care is deemed appropriate at this current time  Michaelmulu Keenan understands that if they can no longer contract for safety, they need to call the office or report to their nearest Emergency Room for immediate evaluation  Assessment/Plan:     Pavithra Montenegro is a 89  o  male with past psychiatric history significant for generalized anxiety disorder, OCD, intermittent explosive disorder, insomnia and cannabis use (medical and recreational) who was evaluated today at the [facility] outpatient clinic for follow-up and medication management  Leonardo is currently linked with Ottonieldouglupillo Ashley for individualized psychotherapy of which he admits to be beneficial  Sandi Franklin states that he has a longstanding history of intermittent explosive disorder and oppositionality  In an impressionistic manner, he is braggadocios regarding past events in which he verbally attacked his parents, peers, and authoritative figures (like teachers)  He speaks at length about "getting money" and how he only cares about "his fam, marijuana, and cash"  A review of documentation reveals a past history of anger outbursts, vindictiveness, mood lability which appears purposeful  Sandi Franklin is proud to share that he often "puts people in their place" when confronted  He hypothesizes this is secondary to "nilda" and hints at a diagnostic history of nilda/hypomania  However, upon extensive questioning, Sandi Franklin has no diagnostic indicators suggestive of an underlying bipolar chemistry  He is able to act appropriately at work ([employer]) and has never been suspended or expelled from school in the past, suggestive of his ability to "switch it on and off" willfully  Sandi Franklin admits to daily cannabis abuse, stating "it's the only thing" that manages his anxiety  He states that his excessive worry and nervousness began in HS and has intensified over the last few years  He has no prior psychotropic medication trials but states that therapy has been helpful  During intake assessment, he laughed and appeared at ease, however, he completed a HAVEN-7 questionnaire which resulted in a score of 18  His answers and inflammatory statements are incongruent with his affect  During intake, Leonardo endorsed several neurovegetative symptoms of depression, such as poor sleep and limited energy  Sandi Franklin speaks at length about his "rule of 6's" today, stating that he often has " 6 thoughts" in his mind at all times   He perseverates on the need for even numbers and is rigid and inflexible when "things don't go his way"  He states that he experiences intrusive and persistent thoughts and behaves in a manner to neutralize these thoughts  He finds that he only eats even number foods (ie 4 or 6 fries at one time) and will have to touch a crack in a sidewalk with his left food if he touched it with his right foot  He denies any signs/symptoms suggestive of PTSD or disordered eating       Today's Plan:     Psychopharmacologically, Leonardo reports psychiatric stability at the moment without lethality concern  He reports appropriate control of mood and anxiety at the moment  He denies need for medication intervention         DSM-V Diagnoses:      1 ) Generalized Anxiety Disorder  2 ) OCD  3 ) Intermittent Explosive Disorder               -R/O ODD  4 ) Cannabis Use (medical and recreational)  5 ) Insomnia   6 ) Alcohol abuse - resolved        Treatment Recommendations/Precautions:      1 ) Generalized Anxiety Disorder  - Continue Prozac 40mg Dailly  - Continue engagement in [address] [place] [address]  - Psychoeducation provided regarding the importance of exercise and healthy dietary choices and their impact on mood, energy, and motivation  - Counseled to avoid ETOH, illict substances, and nicotine secondary to the detrimental effects of these substances on mental and physical health  - Encouraged to engage in non-verbal forms of therapy such as art therapy, music therapy, and mindfulness        2 ) OCD  - Continue Prozac 40mg Dailly        3  ) Intermittent Explosive Disorder               -R/O ODD  - Continue Prozac 40mg Dailly  - Consider use of Lamictal in future         4 ) Cannabis Use (medical and recreational)  - Psychoeducation provided regarding the importance of exercise and healthy dietary choices and their impact on mood, energy, and motivation  - Counseled to avoid ETOH, illict substances, and nicotine secondary to the detrimental effects of these substances on mental and physical health  - Not willing to engage in sobriety at this juncture      5 ) Insomnia   - No current tx    6 ) Alcohol abuse- resolved   - Counseled to avoid ETOH, illict substances, and nicotine secondary to the detrimental effects of these substances on mental and physical health          Medication management every 4 months  Continue psychotherapy with [facility] therapist Martha Talavera  Aware of need to follow up with family physician for medical issues  Aware of 24 hour and weekend coverage for urgent situations accessed by calling [facility] main practice number    Medications Risks/Benefits      Risks, Benefits And Possible Side Effects Of Medications:    Risks, benefits, and possible side effects of medications explained to [facility] including risk of suicidality and serotonin syndrome related to treatment with antidepressants  He verbalizes understanding and agreement for treatment  Controlled Medication Discussion:     Not applicable    Psychotherapy Provided:     Individual psychotherapy provided: No     Treatment Plan:    Completed and signed during the session: Not applicable - Treatment Plan to be completed by Stuart [address] [place] [address] therapist    Note Share Disclaimer:      This note was not shared with the patient due to reasonable likelihood of causing patient harm      Warden Denzel MD  Board Certified Diplomate of the American Board of Psychiatry and Neurology  09/20/22

## 2022-09-20 ENCOUNTER — TELEMEDICINE (OUTPATIENT)
Dept: PSYCHIATRY | Facility: CLINIC | Age: 22
End: 2022-09-20
Payer: COMMERCIAL

## 2022-09-20 DIAGNOSIS — F41.1 GENERALIZED ANXIETY DISORDER: Primary | ICD-10-CM

## 2022-09-20 DIAGNOSIS — Z79.899 MEDICAL CANNABIS USE: ICD-10-CM

## 2022-09-20 DIAGNOSIS — F63.81 INTERMITTENT EXPLOSIVE DISORDER IN ADULT: ICD-10-CM

## 2022-09-20 PROCEDURE — 99213 OFFICE O/P EST LOW 20 MIN: CPT | Performed by: STUDENT IN AN ORGANIZED HEALTH CARE EDUCATION/TRAINING PROGRAM

## 2022-09-20 RX ORDER — FLUOXETINE HYDROCHLORIDE 40 MG/1
40 CAPSULE ORAL DAILY
Qty: 90 CAPSULE | Refills: 2 | Status: SHIPPED | OUTPATIENT
Start: 2022-09-20

## 2022-09-20 NOTE — Clinical Note
Georgi Comer,  Spoke with Orville del real today  He is functioning well at school  He did voice interest in returning to therapy with you  Just wanted to make you aware    Best, Dr Sundeep Conde

## 2022-11-25 ENCOUNTER — OFFICE VISIT (OUTPATIENT)
Dept: URGENT CARE | Facility: CLINIC | Age: 22
End: 2022-11-25

## 2022-11-25 VITALS — TEMPERATURE: 97.9 F | HEART RATE: 71 BPM | OXYGEN SATURATION: 99 % | RESPIRATION RATE: 18 BRPM

## 2022-11-25 DIAGNOSIS — J45.909 ASTHMA, UNSPECIFIED ASTHMA SEVERITY, UNSPECIFIED WHETHER COMPLICATED, UNSPECIFIED WHETHER PERSISTENT: ICD-10-CM

## 2022-11-25 DIAGNOSIS — B34.9 ACUTE VIRAL SYNDROME: Primary | ICD-10-CM

## 2022-11-25 RX ORDER — ALBUTEROL SULFATE 90 UG/1
2 AEROSOL, METERED RESPIRATORY (INHALATION) EVERY 6 HOURS PRN
Qty: 8.5 G | Refills: 0 | Status: SHIPPED | OUTPATIENT
Start: 2022-11-25

## 2022-11-25 RX ORDER — IPRATROPIUM BROMIDE AND ALBUTEROL SULFATE 2.5; .5 MG/3ML; MG/3ML
3 SOLUTION RESPIRATORY (INHALATION) 4 TIMES DAILY
Qty: 40 ML | Refills: 0 | Status: SHIPPED | OUTPATIENT
Start: 2022-11-25

## 2022-11-25 RX ORDER — METHYLPREDNISOLONE 4 MG/1
TABLET ORAL
Qty: 1 EACH | Refills: 0 | Status: SHIPPED | OUTPATIENT
Start: 2022-11-25

## 2022-11-25 RX ORDER — BENZONATATE 200 MG/1
200 CAPSULE ORAL 3 TIMES DAILY PRN
Qty: 20 CAPSULE | Refills: 0 | Status: SHIPPED | OUTPATIENT
Start: 2022-11-25

## 2022-11-25 NOTE — PROGRESS NOTES
[facility]        NAME: Saul Perdomo is a 23 y o  male  : 2000    MRN: [id number]  DATE: 2022  TIME: 9:53 AM    Assessment and Plan   Acute viral syndrome [B34 9]  1  Acute viral syndrome  Covid/Flu-Office Collect      2  Asthma, unspecified asthma severity, unspecified whether complicated, unspecified whether persistent  ipratropium-albuterol (DUO-NEB) 0 5-2 5 mg/3 mL nebulizer solution    albuterol (ProAir HFA) 90 mcg/act inhaler    methylPREDNISolone 4 MG tablet therapy pack    benzonatate (TESSALON) 200 MG capsule            Patient Instructions       Follow up with PCP in 3-5 days  Proceed to  ER if symptoms worsen  Chief Complaint     Chief Complaint   Patient presents with   • Nasal Congestion     Nasal congestion, body aches and wheezing since Monday  History of Present Illness       Patient is a 23 y/o/m presenting [facility] with cough, wheezing, nasal congestion and body aches  Patient reports symptoms began 4 days ago  Patient received the Covid-19 booster and Influenza vaccination the day prior to onset of symptoms  Patient has a hx of asthma and has been using inhaler and nebulizer at home  Pt is in no acute distress  URI   This is a new problem  The current episode started in the past 7 days  The problem has been gradually worsening  There has been no fever  Associated symptoms include congestion, coughing, rhinorrhea and wheezing  Pertinent negatives include no abdominal pain, chest pain, dysuria, ear pain, rash, sore throat or vomiting  He has tried inhaler use for the symptoms  The treatment provided mild relief  Review of Systems   Review of Systems   Constitutional: Negative for chills and fever  HENT: Positive for congestion and rhinorrhea  Negative for ear pain and sore throat  Eyes: Negative for pain and visual disturbance  Respiratory: Positive for cough and wheezing  Negative for shortness of breath      Cardiovascular: Negative for chest pain and palpitations  Gastrointestinal: Negative for abdominal pain and vomiting  Genitourinary: Negative for dysuria and hematuria  Musculoskeletal: Positive for myalgias  Negative for arthralgias and back pain  Skin: Negative for color change and rash  Neurological: Negative for seizures and syncope  All other systems reviewed and are negative          Current Medications       Current Outpatient Medications:   •  albuterol (ProAir HFA) 90 mcg/act inhaler, Inhale 2 puffs every 6 (six) hours as needed for wheezing, Disp: 8 5 g, Rfl: 0  •  benzonatate (TESSALON) 200 MG capsule, Take 1 capsule (200 mg total) by mouth 3 (three) times a day as needed for cough, Disp: 20 capsule, Rfl: 0  •  ipratropium-albuterol (DUO-NEB) 0 5-2 5 mg/3 mL nebulizer solution, Take 3 mL by nebulization 4 (four) times a day, Disp: 40 mL, Rfl: 0  •  methylPREDNISolone 4 MG tablet therapy pack, Use as directed on package, Disp: 1 each, Rfl: 0  •  ADVAIR HFA 45-21 MCG/ACT inhaler,  , Disp: , Rfl: 11  •  albuterol (PROVENTIL HFA,VENTOLIN HFA) 90 mcg/act inhaler, Inhale 1 puff every 6 (six) hours, Disp: 1 Inhaler, Rfl: 0  •  cetirizine-pseudoephedrine (ZyrTEC-D) 5-120 MG per tablet, Take 1 tablet by mouth 2 (two) times a day as needed  , Disp: , Rfl:   •  FLUoxetine (PROzac) 40 MG capsule, Take 1 capsule (40 mg total) by mouth daily, Disp: 90 capsule, Rfl: 2  •  fluticasone (FLONASE) 50 mcg/act nasal spray, into each nostril 2 (two) times a day as needed  , Disp: , Rfl:   •  ipratropium-albuterol (DUO-NEB) 0 5-2 5 mg/3 mL, Inhale, Disp: , Rfl:   •  levalbuterol (XOPENEX HFA) 45 mcg/act inhaler, Inhale 1-2 puffs every 4 (four) hours as needed for wheezing , Disp: , Rfl:   •  Multiple Vitamin (MULTI-DAY VITAMINS PO), daily, Disp: , Rfl:   •  ondansetron (ZOFRAN) 4 mg tablet, Take 1 tablet (4 mg total) by mouth every 6 (six) hours, Disp: 12 tablet, Rfl: 0  •  Riboflavin (VITAMIN B-2) 100 MG TABS, TAKE 1 TABLET BY MOUTH TWICE DAILY, Disp: , Rfl: 0    Current Allergies     Allergies as of 11/25/2022   • (No Known Allergies)            The following portions of the patient's history were reviewed and updated as appropriate: allergies, current medications, past family history, past medical history, past social history, past surgical history and problem list      Past Medical History:   Diagnosis Date   • Acute appendicitis 3/11/2022   • Allergic     enviromental   • Asthma    • Concussion    • Sprain and strain of left wrist        Past Surgical History:   Procedure Laterality Date   • APPENDECTOMY  03/12/2022   • APPENDECTOMY LAPAROSCOPIC N/A 3/12/2022    Procedure: APPENDECTOMY LAPAROSCOPIC;  Surgeon: Jersey Donovan MD;  Location: [facility];  Service: General   • LYMPH NODE DISSECTION  2010   • MYRINGOTOMY W/ TUBES         Family History   Problem Relation Age of Onset   • No Known Problems Father          Medications have been verified  Objective   Pulse 71   Temp 97 9 °F (36 6 °C)   Resp 18   SpO2 99%   No LMP for male patient  Physical Exam     Physical Exam  Constitutional:       Appearance: Normal appearance  He is normal weight  HENT:      Head: Normocephalic and atraumatic  Nose: Congestion present  Mouth/Throat:      Mouth: Mucous membranes are dry  Eyes:      Extraocular Movements: Extraocular movements intact  Conjunctiva/sclera: Conjunctivae normal       Pupils: Pupils are equal, round, and reactive to light  Cardiovascular:      Rate and Rhythm: Normal rate and regular rhythm  Pulmonary:      Effort: Pulmonary effort is normal       Breath sounds: Wheezing present  Musculoskeletal:         General: Normal range of motion  Cervical back: Normal range of motion and neck supple  Skin:     General: Skin is warm and dry  Neurological:      General: No focal deficit present  Mental Status: He is alert and oriented to person, place, and time     Psychiatric: Mood and Affect: Mood normal          Behavior: Behavior normal

## 2022-11-26 LAB
FLUAV RNA RESP QL NAA+PROBE: NEGATIVE
FLUBV RNA RESP QL NAA+PROBE: NEGATIVE
SARS-COV-2 RNA RESP QL NAA+PROBE: NEGATIVE

## 2023-01-09 NOTE — PSYCH
MEDICATION MANAGEMENT NOTE        [facility]      Name and Date of Birth:  Saloni Culp 23 y o  2000 MRN: [id number]    Date of Visit: January 10, 2023    Reason for Visit: Follow-up visit for medication management     Virtual Visit Disclaimer:       TeleMed provider: Warden Denzel MD    Location: at [address] [facility] [address], [address] in [place], [place], [address]    Verification of patient location:     Patient is currently located in the [facility]  Patient is currently located in a state in which I am licensed     After connecting through [employer], the patient was identified by name and date of birth  Saloni Culp was informed that this is a telemedicine visit that is being conducted through [address] [place] [address] Now, and the patient was informed that this is a secure, HIPAA-compliant platform  My office door was closed  No one else was in the room  Saloni Culp acknowledged consent and understanding of privacy and security of the video platform  Michelle Langford understands that the online visit is based solely on information provided by the patient, and that, in the absence of a face-to-face physical evaluation by the physician, the diagnosis Michelle Langford  receives is both limited and provisional in terms of accuracy and completeness  Leannehimanshu Culp understands that they can discontinue the visit at any time  I informed Michelle Langford that I have reviewed their record in EPIC and presented the opportunity for them to ask any questions regarding the visit today  Saloni Culp voiced understanding and consented to these terms  Michelle Langford is aware this is a billable service        SUBJECTIVE:    Saloni Culp is a 23 y o  male with past psychiatric history significant for generalized anxiety disorder, OCD, intermittent explosive disorder, insomnia and cannabis use (medical and recreational) who was personally seen and evaluated today at the [address] [facility] [address] outpatient clinic for follow-up and medication management  Zachary Tyler presents as pleasant and cooperative  His thoughts are linear and organized  He completes assessment without difficulty  Of note, Zachary Tyler was interviewed alongside Kemi Laurent, [facility] [employer] [facility] [employer] [facility] student, with his consent  Leonardo endorses compliance with psychotropic medication regimen consisting of Prozac 40mg Daily  Zachary Tyler denies adverse medication side effects  Zachary Tyler successfully completed fall semester and speaks at length today regarding his Mandarin class and ways he enjoyed this  He is now minoring in this field  Since transitioning home from school for winter break, Zachary Tyler reports challenges associated with familial dynamics  He and his father engaged in multiple verbal disputes  This was processed at length and Leonardo's approach and cognitive pattern was restructured, as parallels were drawn regarding ways his behavior and personality structure mirrors that of his fathers  Zachary Tyler is pleased to share that the intensity and frequency of his verbal outbursts has been less  He speaks to his progress over the last 2 years and how he is often responding vs reacting, which is emotionally driven  We discussed ways lack of routine/structure have resulted in more problematic behavior to which he agreed  Acutely, Zachary Tyler reports adequate sleep and healthy appetite  No active SI/HI  His energy and motivation are at baseline  He is currently working for his [employer] and discusses future plans/classes  Zachary Tyler denies crying spells or anhedonia  He does report uptick in migraines secondary to acute distress related to familial dynamics and relationship strife  He and his girlfriend are struggling to communicate  Supportive therapy and joint problem solving utilized  Additionally, I discussed "languages of love" and ways to give and receive love in an appropriate manner  We discussed the importance of communication and for Zachary Tyler to return to therapy, to which was agreeable   Zachary Tyler was tense at times today  He does report feeling on-edge at home with parents and in his relationship  He denies panic symptoms  He is able to decompress  During today's examination, Terrie Zavaleta does not exhibit objective evidence of nilda/hypomania or navin psychosis  Terrie Zavaleta denies recent ETOH or illicit substance abuse  He offers no further concerns  Current Rating Scores:     None completed today  Review Of Systems:      Constitutional negative   ENT negative   Cardiovascular negative   Respiratory negative   Gastrointestinal negative   Genitourinary negative   Musculoskeletal negative   Integumentary negative   Neurological migraine headache   Endocrine negative   Other Symptoms none, all other systems are negative       Past Psychiatric History: (unchanged information from previous note copied and italicized) - Information that is bolded has been updated       Inpatient psychiatric admissions: Denies  Prior outpatient psychiatric linkage: Denies  Past/current psychotherapy: Currently linked with Jose E Robb (psychotherapy)   History of suicidal attempts/gestures: Denies  History of violence/aggressive behaviors: Yes, towards parents, peers and authoritative figures  Psychotropic medication trials: Paxil (D/C because of fatigue), Prozac (now), Hydroxyzine (now)  Substance abuse inpatient/outpatient rehabilitation: Denies     Substance Abuse History: (unchanged information from previous note copied and italicized) - Information that is bolded has been updated       No history of ETOH or tobacco abuse  However, patient does use cannabis daily  No past legal actions or arrests secondary to substance intoxication  The patient denies prior DWIs/DUIs   No history of outpatient/inpatient rehabilitation programs  Leonardo does not exhibit objective evidence of substance withdrawal during today's examination nor does Leonardo appear under the influence of any psychoactive substance           Social History: (unchanged information from previous note copied and italicized) - Information that is bolded has been updated       Developmental: Denies a history of milestone/developmental delay  Denies a history of in-utero exposure to toxins/illicit substances  There is no documented history of IEP or need for special education    Education: some college - currently enrolled at [employer]   Marital history: single  Living arrangement, social support: parents  Occupational History: Works at local [employer] also is full-time student  Access to firearms: Denies direct access to weapons/firearms  Leonardo Montenegro has no history of arrests or violence with a deadly weapon       Traumatic History: (unchanged information from previous note copied and italicized) - Information that is bolded has been updated    Valene Human is reported  Other Traumatic Events: Denies      Past Medical History:    Past Medical History:   Diagnosis Date   • Acute appendicitis 3/11/2022   • Allergic     enviromental   • Asthma    • Concussion    • Sprain and strain of left wrist         Past Surgical History:   Procedure Laterality Date   • APPENDECTOMY  03/12/2022   • APPENDECTOMY LAPAROSCOPIC N/A 3/12/2022    Procedure: APPENDECTOMY LAPAROSCOPIC;  Surgeon: Booker Plaza MD;  [facility];  Service: General   • LYMPH NODE DISSECTION  2010   • MYRINGOTOMY W/ TUBES       No Known Allergies    Substance Abuse History:    Social History     Substance and Sexual Activity   Alcohol Use Never     Social History     Substance and Sexual Activity   Drug Use Never       Social History:    Social History     Socioeconomic History   • Marital status: Single     Spouse name: Not on file   • Number of children: Not on file   • Years of education: Not on file   • Highest education level: Not on file   Occupational History   • Not on file   Tobacco Use   • Smoking status: Never   • Smokeless tobacco: Never   Vaping Use   • Vaping Use: Some days • Substances: THC, CBD   Substance and Sexual Activity   • Alcohol use: Never   • Drug use: Never   • Sexual activity: Not Currently     Partners: Female   Other Topics Concern   • Not on file   Social History Narrative   • Not on file     Social Determinants of Health     Financial Resource Strain: Not on file   Food Insecurity: Not on file   Transportation Needs: Not on file   Physical Activity: Not on file   Stress: Not on file   Social Connections: Not on file   Intimate Partner Violence: Not on file   Housing Stability: Not on file       Family Psychiatric History:     Family History   Problem Relation Age of Onset   • No Known Problems Father        History Review: The following portions of the patient's history were reviewed and updated as appropriate: allergies, current medications, past family history, past medical history, past social history, past surgical history and problem list          OBJECTIVE:     Vital signs in last 24 hours: There were no vitals filed for this visit      Mental Status Evaluation:    Appearance age appropriate, casually dressed, dressed appropriately, looks stated age   Behavior pleasant, cooperative, calm, good eye contact   Speech normal rate, normal volume, normal pitch   Mood "good"   Affect normal range and intensity, appropriate   Thought Processes organized, logical, goal directed   Associations intact associations   Thought Content no overt delusions   Perceptual Disturbances: no auditory hallucinations, no visual hallucinations   Abnormal Thoughts  Risk Potential Suicidal ideation - None at present  Homicidal ideation - None at present  Potential for aggression - No   Orientation oriented to person, place, time/date and situation   Memory recent and remote memory grossly intact   Consciousness alert and awake   Attention Span Concentration Span attention span and concentration are age appropriate   Intellect appears to be of average intelligence   Insight intact and good   Judgement intact and good   Muscle Strength and  Gait normal gait and normal balance   Motor activity no abnormal movements   Language no difficulty naming common objects   Fund of Knowledge adequate knowledge of current events   Pain none   Pain Scale Did not ask patient to formally rate       Laboratory Results: I have personally reviewed all pertinent laboratory/tests results    Recent Labs (last 2 months):   Office Visit on 11/25/2022   Component Date Value   • SARS-CoV-2 11/25/2022 Negative    • INFLUENZA A PCR 11/25/2022 Negative    • INFLUENZA B PCR 11/25/2022 Negative        Suicide/Homicide Risk Assessment:    The following interventions are recommended: no intervention changes needed      Lethality Statement:    Based on today's assessment and clinical criteria, [employer] [employer] contracts for safety and is not an imminent risk of harm to self or others  Outpatient level of care is deemed appropriate at this current time  Sumayain Ilana understands that if they can no longer contract for safety, they need to call the office or report to their nearest Emergency Room for immediate evaluation  Assessment/Plan:     Iftikhar Montenegro is a [id number]  o  male with past psychiatric history significant for generalized anxiety disorder, OCD, intermittent explosive disorder, insomnia and cannabis use (medical and recreational) who was evaluated today at the [address] [place] [address] [address] outpatient clinic for follow-up and medication management  Leonardo is currently linked with [facility] Nurse for individualized psychotherapy of which he admits to be beneficial  Sincere Preciado states that he has a longstanding history of intermittent explosive disorder and oppositionality  In an impressionistic manner, he is braggadocios regarding past events in which he verbally attacked his parents, peers, and authoritative figures (like teachers)  He speaks at length about "getting money" and how he only cares about "his fam, marijuana, and cash"   A review of documentation reveals a past history of anger outbursts, vindictiveness, mood lability which appears purposeful  Sohail Serrato is proud to share that he often "puts people in their place" when confronted  He hypothesizes this is secondary to "nilda" and hints at a diagnostic history of nilda/hypomania  However, upon extensive questioning, Sohail Serrato has no diagnostic indicators suggestive of an underlying bipolar chemistry  He is able to act appropriately at work ([employer]) and has never been suspended or expelled from school in the past, suggestive of his ability to "switch it on and off" willfully  Sohail Serrato admits to daily cannabis abuse, stating "it's the only thing" that manages his anxiety  He states that his excessive worry and nervousness began in HS and has intensified over the last few years  He has no prior psychotropic medication trials but states that therapy has been helpful  During intake assessment, he laughed and appeared at ease, however, he completed a HAVEN-7 questionnaire which resulted in a score of 18  His answers and inflammatory statements are incongruent with his affect  During intake, Leonardo endorsed several neurovegetative symptoms of depression, such as poor sleep and limited energy  Sohail Serrato speaks at length about his "rule of 6's" today, stating that he often has " 6 thoughts" in his mind at all times  He perseverates on the need for even numbers and is rigid and inflexible when "things don't go his way"  He states that he experiences intrusive and persistent thoughts and behaves in a manner to neutralize these thoughts  He finds that he only eats even number foods (ie 4 or 6 fries at one time) and will have to touch a crack in a sidewalk with his left food if he touched it with his right foot  He denies any signs/symptoms suggestive of PTSD or disordered eating       Today's Plan:     Psychopharmacologically, Leonardo reports tolerability and benefit from Prozac 40mg Daily   He denies current lethality concern or any need for optimization of this agent          DSM-V Diagnoses:      1 ) Generalized Anxiety Disorder  2 ) OCD  3 ) Intermittent Explosive Disorder               -R/O ODD  4 ) Cannabis Use (medical and recreational)  5 ) Insomnia   6 ) Alcohol abuse - resolved        Treatment Recommendations/Precautions:      1 ) Generalized Anxiety Disorder  - Continue Prozac 40mg Dailly  - Re-linked with psychotherapy with Camron Palencia  - Psychoeducation provided regarding the importance of exercise and healthy dietary choices and their impact on mood, energy, and motivation  - Counseled to avoid ETOH, illict substances, and nicotine secondary to the detrimental effects of these substances on mental and physical health  - Encouraged to engage in non-verbal forms of therapy such as art therapy, music therapy, and mindfulness        2 ) OCD  - Continue Prozac 40mg Dailly        3  ) Intermittent Explosive Disorder               -R/O ODD  - Continue Prozac 40mg Dailly  - Consider use of Lamictal in future         4 ) Cannabis Use (medical and recreational)  - Psychoeducation provided regarding the importance of exercise and healthy dietary choices and their impact on mood, energy, and motivation  - Counseled to avoid ETOH, illict substances, and nicotine secondary to the detrimental effects of these substances on mental and physical health  - Not willing to engage in sobriety at this juncture      5 ) Insomnia   - No current tx     6 ) Alcohol abuse- resolved   - Counseled to avoid ETOH, illict substances, and nicotine secondary to the detrimental effects of these substances on mental and physical health      Medication management every 4 months  Continue psychotherapy with [facility] therapist [address] of need to follow up with family physician for medical issues  Aware of 24 hour and weekend coverage for urgent situations accessed by calling [facility] main practice number    Medications Risks/Benefits      Risks, Benefits And Possible Side Effects Of Medications:    Risks, benefits, and possible side effects of medications explained to [facility] including risk of suicidality and serotonin syndrome related to treatment with antidepressants  He verbalizes understanding and agreement for treatment  Controlled Medication Discussion:     Not applicable    Psychotherapy Provided:     Individual psychotherapy provided: Yes  Counseling was provided during the session today for 16 minutes  Medications, treatment progress and treatment plan reviewed with [facility]  Medication education provided to [facility]  Recent stressors discussed with [facility] including family problems, family conflict, family issues, relationship problems, medical illness in family, school stress, health issues, medical problems, limited support, everyday stressors and occasional anxiety  Coping strategies reviewed with [facility]  Educated on importance of medication and treatment compliance  Importance of follow up with family physician for medical issues reviewed with [facility]  Supportive therapy provided  Cognitive therapy was utilized during the session  Treatment Plan:    Completed and signed during the session: Yes - with [facility]      Visit Time    Visit Start Time: 11:30 AM  Visit Stop Time: 12:00 PM  Total Visit Duration: 30 minutes     The total visit duration detailed above includes: patient engagement, medication management, psychotherapy/counseling, discussion regarding treatment goals, and coordination of care  Note Share Disclaimer:      This note was not shared with the patient due to reasonable likelihood of causing patient harm      Julio Catalan MD  Board Certified Diplomate of the [address] [place] [address] Rd , [address] of Psychiatry and Neurology  01/10/23

## 2023-01-10 ENCOUNTER — TELEPHONE (OUTPATIENT)
Dept: PSYCHIATRY | Facility: CLINIC | Age: 23
End: 2023-01-10

## 2023-01-10 ENCOUNTER — TELEMEDICINE (OUTPATIENT)
Dept: PSYCHIATRY | Facility: CLINIC | Age: 23
End: 2023-01-10

## 2023-01-10 DIAGNOSIS — F41.1 GENERALIZED ANXIETY DISORDER: Primary | ICD-10-CM

## 2023-01-10 DIAGNOSIS — Z79.899 MEDICAL CANNABIS USE: ICD-10-CM

## 2023-01-10 DIAGNOSIS — F42.2 MIXED OBSESSIONAL THOUGHTS AND ACTS: ICD-10-CM

## 2023-01-10 DIAGNOSIS — F63.81 INTERMITTENT EXPLOSIVE DISORDER IN ADULT: ICD-10-CM

## 2023-01-10 RX ORDER — FLUOXETINE HYDROCHLORIDE 40 MG/1
40 CAPSULE ORAL DAILY
Qty: 90 CAPSULE | Refills: 2 | Status: SHIPPED | OUTPATIENT
Start: 2023-01-10

## 2023-01-10 NOTE — BH TREATMENT PLAN
TREATMENT PLAN (Medication Management Only)        [facility]    Name and Date of Birth:  Brittany Vazquez 23 y o  2000  Date of Treatment Plan: January 10, 2023  Diagnosis/Diagnoses:    1  Generalized anxiety disorder    2  Intermittent explosive disorder in adult    3  Mixed obsessional thoughts and acts    4  Medical cannabis use      Strengths/Personal Resources for Self-Care: supportive family, supportive friends, taking medications as prescribed, ability to adapt to life changes, ability to communicate well, ability to listen, ability to reason, good understanding of illness, motivation for treatment, ability to negotiate basic needs, being resoureceful, self-reliance, sense of humor, special hobby/interest, stable employment  Area/Areas of need (in own words): anxiety symptoms, depressive symptoms, mood instability  1  Long Term Goal: maintain control of mood  Target Date:6 months - 7/10/2023  Person/Persons responsible for completion of goal: Leonardo Manzano  Short Term Objective (s) - How will we reach this goal?:   A  Provider new recommended medication/dosage changes and/or continue medication(s): continue current medications as prescribed  B  Attend medication management appointments regularly  C  Take psychiatric medications responsibly  D  Return to psychotherapy with Charmayne Duster  E> Communicate more effectively with girlfriend   Target Date:6 months - 7/10/2023  Person/Persons Responsible for Completion of Goal: Elvis Lazcano  Progress Towards Goals: stable  Treatment Modality: medication management every 4 months  Review due 180 days from date of this plan: 6 months - 7/10/2023  Expected length of service: ongoing treatment  My Physician/PA/NP and I have developed this plan together and I agree to work on the goals and objectives  I understand the treatment goals that were developed for my treatment  Treatment Plan completed with assistance and input from patient and verbal consent provided  Treatment plan was not signed at time of office visit secondary to COVID-19 social distancing guidelines

## 2023-01-10 NOTE — TELEPHONE ENCOUNTER
LVM  Last seen 1/10/23  Pt needs a 4 month f/u with Dr Robel Crowe  Previous appt was virtual  Please schedule

## 2023-03-16 ENCOUNTER — HOSPITAL ENCOUNTER (EMERGENCY)
Facility: HOSPITAL | Age: 23
Discharge: HOME | End: 2023-03-16
Attending: EMERGENCY MEDICINE | Admitting: EMERGENCY MEDICINE

## 2023-03-16 ENCOUNTER — APPOINTMENT (EMERGENCY)
Dept: RADIOLOGY | Facility: HOSPITAL | Age: 23
End: 2023-03-16

## 2023-03-16 VITALS
BODY MASS INDEX: 22.38 KG/M2 | WEIGHT: 180 LBS | OXYGEN SATURATION: 98 % | HEIGHT: 75 IN | DIASTOLIC BLOOD PRESSURE: 89 MMHG | HEART RATE: 78 BPM | SYSTOLIC BLOOD PRESSURE: 134 MMHG | TEMPERATURE: 98.7 F | RESPIRATION RATE: 16 BRPM

## 2023-03-16 DIAGNOSIS — S91.209A AVULSION OF TOENAIL OF RIGHT FOOT: Primary | ICD-10-CM

## 2023-03-16 DIAGNOSIS — S91.211A LACERATION OF RIGHT GREAT TOE WITHOUT FOREIGN BODY WITH DAMAGE TO NAIL, INITIAL ENCOUNTER: ICD-10-CM

## 2023-03-16 RX ORDER — ACETAMINOPHEN 325 MG/1
650 TABLET ORAL ONCE
Status: COMPLETED | OUTPATIENT
Start: 2023-03-16 | End: 2023-03-16

## 2023-03-16 RX ORDER — LIDOCAINE HYDROCHLORIDE 20 MG/ML
10 INJECTION, SOLUTION EPIDURAL; INFILTRATION; INTRACAUDAL; PERINEURAL ONCE
Status: COMPLETED | OUTPATIENT
Start: 2023-03-16 | End: 2023-03-16

## 2023-03-16 RX ADMIN — ACETAMINOPHEN 650 MG: 325 TABLET, FILM COATED ORAL at 15:11

## 2023-03-16 RX ADMIN — LIDOCAINE HYDROCHLORIDE 10 ML: 20 INJECTION, SOLUTION EPIDURAL; INFILTRATION; INTRACAUDAL at 15:50

## 2023-03-16 NOTE — ED PROVIDER NOTES
History  Chief Complaint   Patient presents with   • Toe Injury     Patient states he dropped scaffolding on R foot approx  20 minutes prior to arrival; c/o pain in great and second toes; bleeding noted to great toe/toenail area     23year old male with no significant PMH presents for evaluation of toe injury on right foot  This occurred today at work just prior to arrival about 20 minutes ago  Pt notes he dropped scaffolding which came down on his foot  He c/o pain in great and 2nd toes  He has been bleeding from an avulsed toenail of the great toe  No other injuries reported  No reported aggravating or alleviating factors  No specific treatments tried  Denies numbness, tingling  Pt reports his tetanus is UTD  Denies fever, chills, cough, congestion or recent illness  Pt has otherwise been in usual state of health  No other injuries reported  Has been working while on spring break from [employer]  Going back to [place] on Sunday  History provided by:  Patient   used: No        Prior to Admission Medications   Prescriptions Last Dose Informant Patient Reported? Taking?    ADVAIR HFA 45-21 MCG/ACT inhaler   Yes No   Sig:     FLUoxetine (PROzac) 40 MG capsule   No No   Sig: Take 1 capsule (40 mg total) by mouth daily   Multiple Vitamin (MULTI-DAY VITAMINS PO)   Yes No   Sig: daily   Riboflavin (VITAMIN B-2) 100 MG TABS   Yes No   Sig: TAKE 1 TABLET BY MOUTH TWICE DAILY   albuterol (PROVENTIL HFA,VENTOLIN HFA) 90 mcg/act inhaler   No No   Sig: Inhale 1 puff every 6 (six) hours   albuterol (ProAir HFA) 90 mcg/act inhaler   No No   Sig: Inhale 2 puffs every 6 (six) hours as needed for wheezing   benzonatate (TESSALON) 200 MG capsule   No No   Sig: Take 1 capsule (200 mg total) by mouth 3 (three) times a day as needed for cough   cetirizine-pseudoephedrine (ZyrTEC-D) 5-120 MG per tablet   Yes No   Sig: Take 1 tablet by mouth 2 (two) times a day as needed     fluticasone (FLONASE) 50 mcg/act nasal spray   Yes No   Sig: into each nostril 2 (two) times a day as needed     ipratropium-albuterol (DUO-NEB) 0 5-2 5 mg/3 mL   Yes No   Sig: Inhale   ipratropium-albuterol (DUO-NEB) 0 5-2 5 mg/3 mL nebulizer solution   No No   Sig: Take 3 mL by nebulization 4 (four) times a day   levalbuterol (XOPENEX HFA) 45 mcg/act inhaler   Yes No   Sig: Inhale 1-2 puffs every 4 (four) hours as needed for wheezing  methylPREDNISolone 4 MG tablet therapy pack   No No   Sig: Use as directed on package   ondansetron (ZOFRAN) 4 mg tablet   No No   Sig: Take 1 tablet (4 mg total) by mouth every 6 (six) hours      Facility-Administered Medications: None       Past Medical History:   Diagnosis Date   • Acute appendicitis 3/11/2022   • Allergic     enviromental   • Asthma    • Concussion    • Sprain and strain of left wrist        Past Surgical History:   Procedure Laterality Date   • APPENDECTOMY  03/12/2022   • APPENDECTOMY LAPAROSCOPIC N/A 3/12/2022    Procedure: APPENDECTOMY LAPAROSCOPIC;  Surgeon: Telma Mejia MD;  Location: [facility];  Service: General   • LYMPH NODE DISSECTION  2010   • MYRINGOTOMY W/ TUBES         Family History   Problem Relation Age of Onset   • No Known Problems Father      I have reviewed and agree with the history as documented  E-Cigarette/Vaping   • E-Cigarette Use Current Some Day User      E-Cigarette/Vaping Substances   • THC Yes    • CBD Yes      Social History     Tobacco Use   • Smoking status: Never   • Smokeless tobacco: Never   Vaping Use   • Vaping Use: Some days   • Substances: THC, CBD   Substance Use Topics   • Alcohol use: Never   • Drug use: Never       Review of Systems   Constitutional: Negative  Negative for chills, fatigue and fever  HENT: Negative  Negative for congestion, rhinorrhea and sore throat  Eyes: Negative  Respiratory: Negative  Negative for cough, shortness of breath and wheezing  Cardiovascular: Negative    Negative for chest pain, palpitations and leg swelling  Gastrointestinal: Negative  Negative for abdominal pain, diarrhea, nausea and vomiting  Genitourinary: Negative  Musculoskeletal: Positive for arthralgias  Negative for back pain and neck pain  Skin: Positive for wound  Negative for rash  Neurological: Negative  Negative for dizziness, numbness and headaches  Psychiatric/Behavioral: Negative  All other systems reviewed and are negative  Physical Exam  Physical Exam  Vitals and nursing note reviewed  Constitutional:       General: He is awake  He is not in acute distress  Appearance: Normal appearance  He is well-developed  He is not toxic-appearing or diaphoretic  HENT:      Head: Normocephalic and atraumatic  Right Ear: Hearing and external ear normal       Left Ear: Hearing and external ear normal       Mouth/Throat:      Mouth: Mucous membranes are moist       Pharynx: Oropharynx is clear  Eyes:      General: Lids are normal  No scleral icterus  Conjunctiva/sclera: Conjunctivae normal    Neck:      Trachea: Trachea and phonation normal    Cardiovascular:      Rate and Rhythm: Normal rate and regular rhythm  Pulses: Normal pulses  Dorsalis pedis pulses are 2+ on the right side and 2+ on the left side  Posterior tibial pulses are 2+ on the right side and 2+ on the left side  Heart sounds: Normal heart sounds, S1 normal and S2 normal    Pulmonary:      Effort: Pulmonary effort is normal  No tachypnea or respiratory distress  Breath sounds: Normal breath sounds  No wheezing, rhonchi or rales  Musculoskeletal:      Right lower leg: No edema  Left lower leg: No edema  Right foot: Normal capillary refill  Laceration and tenderness present  No swelling or crepitus  Normal pulse  Feet:       Comments: There is dried blood of the distal great toe  Toenail is avulsed  No current bleeding  No bruising  Other nails appear intact     Skin:     General: Skin is warm and dry  Capillary Refill: Capillary refill takes less than 2 seconds  Findings: Wound present  No bruising, erythema or rash  Neurological:      General: No focal deficit present  Mental Status: He is alert and oriented to person, place, and time  GCS: GCS eye subscore is 4  GCS verbal subscore is 5  GCS motor subscore is 6  Sensory: Sensation is intact  Motor: Motor function is intact  Gait: Gait abnormal (limping)  Psychiatric:         Mood and Affect: Mood normal          Speech: Speech normal          Behavior: Behavior normal  Behavior is cooperative  Vital Signs  ED Triage Vitals [03/16/23 1426]   Temperature Pulse Respirations Blood Pressure SpO2   98 7 °F (37 1 °C) 84 16 131/94 99 %      Temp Source Heart Rate Source Patient Position - Orthostatic VS BP Location FiO2 (%)   Temporal Monitor -- -- --      Pain Score       --           Vitals:    03/16/23 1426 03/16/23 1530   BP: 131/94 134/89   Pulse: 84 78         Visual Acuity      ED Medications  Medications   lidocaine (PF) (XYLOCAINE-MPF) 2 % injection 10 mL (10 mL Infiltration Given 3/16/23 1550)   acetaminophen (TYLENOL) tablet 650 mg (650 mg Oral Given 3/16/23 1511)       Diagnostic Studies  Results Reviewed     None                 XR foot 3+ views RIGHT    (Results Pending)              Procedures  Digital Block  Performed by: Selin Dent PA-C  Authorized by: Selin Dent PA-C     Procedure date/time:  3/16/2023 3:21 PM  Consent:     Consent obtained:  Verbal    Consent given by:  Patient    Risks discussed:   Allergic reaction, bleeding, infection, intravascular injection, nerve damage, pain, unsuccessful block and swelling    Alternatives discussed:  Alternative treatment  Indications:     Indications:  Pain relief and procedural anesthesia  Location:     Block location:  Toe    Toe blocked:  R great toe  Pre-procedure details:     Neurovascular status: intact      Skin preparation: Alcohol  Procedure details (see MAR for exact dosages):     Syringe type:  Luer lock syringe    Needle gauge:  25 G    Anesthetic injected:  Lidocaine 2% w/o epi    Technique: Three-sided block    Injection procedure:  Anatomic landmarks identified, anatomic landmarks palpated, introduced needle, incremental injection and negative aspiration for blood  Post-procedure details:     Outcome:  Anesthesia achieved    Patient tolerance of procedure: Tolerated well, no immediate complications  Laceration repair    Date/Time: 3/16/2023 3:28 PM  Performed by: Danilo Valera PA-C  Authorized by: Danilo Valera PA-C   Consent: Verbal consent obtained  Risks and benefits: risks, benefits and alternatives were discussed  Consent given by: patient  Patient understanding: patient states understanding of the procedure being performed  Site marked: the operative site was marked  Radiology Images displayed and confirmed  If images not available, report reviewed: imaging studies available  Required items: required blood products, implants, devices, and special equipment available  Patient identity confirmed: verbally with patient and arm band  Time out: Immediately prior to procedure a "time out" was called to verify the correct patient, procedure, equipment, support staff and site/side marked as required  Body area: lower extremity  Location details: right big toe  Laceration length: 0 5 cm  Foreign bodies: no foreign bodies  Tendon involvement: none  Nerve involvement: none  Anesthesia: digital block and see MAR for details    Sedation:  Patient sedated: no        Procedure Details:  Preparation: Patient was prepped and draped in the usual sterile fashion    Irrigation solution: saline  Irrigation method: syringe  Amount of cleaning: standard  Debridement: none  Degree of undermining: none  Subcutaneous closure: 5-0 Vicryl  Number of sutures: 2  Technique: simple  Approximation: close  Approximation difficulty: simple  Dressing: gauze roll and non-adhesive packing strip  Patient tolerance: patient tolerated the procedure well with no immediate complications  Comments: After digital block was performed, area was thoroughly cleaned  Pt avulsed the distal 60% of the toenail which was no longer attached  He still had the proximal toenail intact at the cuticle  There was a small laceration of the medial edge of the nailbed in which 2 5-0 vicryl sutures were placed  Hemostasis achieved  Pt aware that distal toenail not salvageable and will leave other piece present as nail will grow out  A piece of surgifoam was placed over nailbed then applied vaseline gauze with gauze roll  Pt tolerated well  ED Course  ED Course as of 03/16/23 1813   Thu Mar 16, 2023   1512 XR foot 3+ views RIGHT  Independently viewed and interpreted by me - no noted fracture or dislocation, no radio-opaque FB; pending official read  1550 Bandage applied     Verbal consent obtained for digital block and repair as indicated  Pt prepped and draped in usual sterile fashion  See procedure note above  Pt tolerated well  Wound approximated and hemostasis achieved  2 dissolvable sutures placed and reviewed they will fall out  Reviewed standard wound care and recommended daily bandage changes  S/sx infection reviewed  OTC meds as needed for pain relief  Strict return precautions outlined  Advised outpatient follow up with occ med tomorrow or return to ER for change in condition as outlined  Pt verbalized understanding and had no further questions  Pt left in stable, improved condition  Medical Decision Making  Avulsion of toenail of right foot: acute illness or injury  Laceration of right great toe without foreign body with damage to nail, initial encounter: acute illness or injury  Amount and/or Complexity of Data Reviewed  Radiology: ordered and independent interpretation performed  Decision-making details documented in ED Course  Risk  OTC drugs  Prescription drug management  Disposition  Final diagnoses:   Avulsion of toenail of right foot   Laceration of right great toe without foreign body with damage to nail, initial encounter     Time reflects when diagnosis was documented in both MDM as applicable and the Disposition within this note     Time User Action Codes Description Comment    3/16/2023  3:57 PM Buddie Mo Add [S91 209A] Avulsion of toenail of right foot     3/16/2023  3:57 PM Buddie Mo Add [C50 487T] Laceration of right great toe without foreign body with damage to nail, initial encounter       ED Disposition     ED Disposition   Discharge    Condition   Stable    Date/Time   Thu Mar 16, 2023  3:57 PM    Comment   Mao Pressley discharge to home/self care                 Follow-up Information     Follow up With Specialties Details Why Contact Info Additional Information    [address] in 1 day  [facility] [address] in 1 day  [address]  [phone]     [facility] Emergency Department Emergency Medicine  As needed [place] [phone]  [address] [place] [address] Emergency Department, [address], [address]          Discharge Medication List as of 3/16/2023  4:03 PM      CONTINUE these medications which have NOT CHANGED    Details   ADVAIR HFA 45-21 MCG/ACT inhaler  , Starting Wed 3/27/2019, Historical Med      !! albuterol (ProAir HFA) 90 mcg/act inhaler Inhale 2 puffs every 6 (six) hours as needed for wheezing, Starting Fri 11/25/2022, Normal      !! albuterol (PROVENTIL HFA,VENTOLIN HFA) 90 mcg/act inhaler Inhale 1 puff every 6 (six) hours, Starting Fri 11/29/2019, Normal benzonatate (TESSALON) 200 MG capsule Take 1 capsule (200 mg total) by mouth 3 (three) times a day as needed for cough, Starting Fri 11/25/2022, Normal      cetirizine-pseudoephedrine (ZyrTEC-D) 5-120 MG per tablet Take 1 tablet by mouth 2 (two) times a day as needed  , Historical Med      FLUoxetine (PROzac) 40 MG capsule Take 1 capsule (40 mg total) by mouth daily, Starting Tue 1/10/2023, Normal      fluticasone (FLONASE) 50 mcg/act nasal spray into each nostril 2 (two) times a day as needed  , Historical Med      !! ipratropium-albuterol (DUO-NEB) 0 5-2 5 mg/3 mL nebulizer solution Take 3 mL by nebulization 4 (four) times a day, Starting Fri 11/25/2022, Normal      !! ipratropium-albuterol (DUO-NEB) 0 5-2 5 mg/3 mL Inhale, Historical Med      levalbuterol (XOPENEX HFA) 45 mcg/act inhaler Inhale 1-2 puffs every 4 (four) hours as needed for wheezing , Historical Med      methylPREDNISolone 4 MG tablet therapy pack Use as directed on package, Normal      Multiple Vitamin (MULTI-DAY VITAMINS PO) daily, Historical Med      ondansetron (ZOFRAN) 4 mg tablet Take 1 tablet (4 mg total) by mouth every 6 (six) hours, Starting Fri 2/7/2020, Normal      Riboflavin (VITAMIN B-2) 100 MG TABS TAKE 1 TABLET BY MOUTH TWICE DAILY, Historical Med       !! - Potential duplicate medications found  Please discuss with provider  No discharge procedures on file      PDMP Review       Value Time User    PDMP Reviewed  Yes 3/13/2022  8:31 AM Montserrat Tavarez PA-C          ED Provider  Electronically Signed by           Mele Ratliff PA-C  03/16/23 2188

## 2023-03-16 NOTE — Clinical Note
Augustine Walter was seen and treated in our emergency department on 3/16/2023  Occupational Medicine Follow Up Within 24 hours            Diagnosis:     Leonardo    He may return on this date: If you have any questions or concerns, please don't hesitate to call        Parvez Noonan PA-C    ______________________________           _______________          _______________  Hospital Representative                              Date                                Time

## 2023-03-16 NOTE — DISCHARGE INSTRUCTIONS
Keep area clean and dry  Daily dressing changes  Follow up with occ med in 24 hours for recheck  The 2 sutures that were placed will dissolve and fall out  Your new nail will gradually grow in over the next several weeks - months (average time for new toenail about 12 weeks)  As the nail grows out you will likely lose the remaining fragment  Watch for signs of infection such as increased redness, warmth, discharge/drainage, fever or any other wound concerns  Continue OTC ibuprofen and tylenol for pain relief  Follow up with occ med for wound recheck or return to ER as needed

## 2023-03-17 ENCOUNTER — APPOINTMENT (OUTPATIENT)
Dept: URGENT CARE | Facility: CLINIC | Age: 23
End: 2023-03-17

## 2023-03-26 ENCOUNTER — OCCMED (OUTPATIENT)
Dept: URGENT CARE | Facility: CLINIC | Age: 23
End: 2023-03-26

## 2023-03-26 DIAGNOSIS — S91.211D LACERATION WITHOUT FOREIGN BODY OF RIGHT GREAT TOE WITH DAMAGE TO NAIL, SUBSEQUENT ENCOUNTER: ICD-10-CM

## 2023-03-26 DIAGNOSIS — S91.209D: Primary | ICD-10-CM

## 2023-03-26 DIAGNOSIS — S90.211D CONTUSION OF RIGHT GREAT TOE WITH DAMAGE TO NAIL, SUBSEQUENT ENCOUNTER: ICD-10-CM

## 2023-05-08 ENCOUNTER — TELEMEDICINE (OUTPATIENT)
Dept: PSYCHIATRY | Facility: CLINIC | Age: 23
End: 2023-05-08

## 2023-05-08 DIAGNOSIS — F42.2 MIXED OBSESSIONAL THOUGHTS AND ACTS: ICD-10-CM

## 2023-05-08 DIAGNOSIS — F63.81 INTERMITTENT EXPLOSIVE DISORDER IN ADULT: ICD-10-CM

## 2023-05-08 DIAGNOSIS — F41.1 GENERALIZED ANXIETY DISORDER: Primary | ICD-10-CM

## 2023-05-08 NOTE — BH TREATMENT PLAN
TREATMENT PLAN (Medication Management Only)        [facility]    Name and Date of Birth:  Dannie Ambriz 23 y o  2000  Date of Treatment Plan: May 8, 2023  Diagnosis/Diagnoses:    1  Generalized anxiety disorder    2  Mixed obsessional thoughts and acts    3  Intermittent explosive disorder in adult      Strengths/Personal Resources for Self-Care: supportive family, supportive friends, taking medications as prescribed, ability to adapt to life changes, ability to communicate needs, ability to communicate well, ability to listen, ability to reason, general fund of knowledge, good physical health, good understanding of illness, motivation for treatment, ability to negotiate basic needs, being resoureceful, self-reliance, well educated, willingness to work on problems  Area/Areas of need (in own words): anxiety symptoms, depressive symptoms, mood instability, mood swings  1  Long Term Goal: improve mood stability  Target Date:6 months - 11/8/2023  Person/Persons responsible for completion of goal: Leonardo  2  Short Term Objective (s) - How will we reach this goal?:   A  Provider new recommended medication/dosage changes and/or continue medication(s): continue current medications as prescribed  B  Attend medication management appointments regularly  C  Take psychiatric medications responsibly  D  Re-start psychotherapy  E  Obtain and complete summer internship   Target Date:6 months - 11/8/2023  Person/Persons Responsible for Completion of Goal: Leonardo  Progress Towards Goals: continuing treatment  Treatment Modality: medication management every 4 months  Review due 180 days from date of this plan: 6 months - 11/8/2023  Expected length of service: ongoing treatment  My Physician/PA/NP and I have developed this plan together and I agree to work on the goals and objectives  I understand the treatment goals that were developed for my treatment  Treatment Plan completed with assistance and input from patient and verbal consent provided  Treatment plan was not signed at time of office visit secondary to COVID-19 social distancing guidelines
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
## 2023-05-09 ENCOUNTER — TELEPHONE (OUTPATIENT)
Dept: PSYCHIATRY | Facility: CLINIC | Age: 23
End: 2023-05-09

## 2023-05-30 ENCOUNTER — OFFICE VISIT (OUTPATIENT)
Dept: URGENT CARE | Facility: CLINIC | Age: 23
End: 2023-05-30

## 2023-05-30 VITALS
OXYGEN SATURATION: 97 % | HEART RATE: 87 BPM | RESPIRATION RATE: 18 BRPM | BODY MASS INDEX: 22.5 KG/M2 | WEIGHT: 180 LBS | TEMPERATURE: 98.6 F

## 2023-05-30 DIAGNOSIS — T24.201A PARTIAL THICKNESS BURN OF RIGHT LOWER EXTREMITY, INITIAL ENCOUNTER: Primary | ICD-10-CM

## 2023-05-30 RX ORDER — CEPHALEXIN 500 MG/1
500 CAPSULE ORAL EVERY 8 HOURS SCHEDULED
Qty: 21 CAPSULE | Refills: 0 | Status: SHIPPED | OUTPATIENT
Start: 2023-05-30 | End: 2023-06-06
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
## 2023-05-30 NOTE — PATIENT INSTRUCTIONS
Keep wrapped with bacitracin and non stick bandage  Change dressing daily or if it gets dirty or wet  Keep covered for 3-4 days  Antibiotic as directed

## 2023-06-09 ENCOUNTER — TELEPHONE (OUTPATIENT)
Dept: PSYCHIATRY | Facility: CLINIC | Age: 23
End: 2023-06-09

## 2023-06-09 NOTE — TELEPHONE ENCOUNTER
Called and spoke with patient to offer a talk therapy appt  Patient informed writer that he would prefer to see Sadie Rajan and not start over with a new therapist  At this time, patient decline therapy services

## 2023-12-16 ENCOUNTER — APPOINTMENT (OUTPATIENT)
Dept: RADIOLOGY | Facility: CLINIC | Age: 23
End: 2023-12-16
Payer: COMMERCIAL

## 2023-12-16 ENCOUNTER — OFFICE VISIT (OUTPATIENT)
Dept: URGENT CARE | Facility: CLINIC | Age: 23
End: 2023-12-16
Payer: COMMERCIAL

## 2023-12-16 VITALS
OXYGEN SATURATION: 98 % | BODY MASS INDEX: 22.26 KG/M2 | HEIGHT: 75 IN | SYSTOLIC BLOOD PRESSURE: 133 MMHG | RESPIRATION RATE: 18 BRPM | HEART RATE: 57 BPM | WEIGHT: 179 LBS | TEMPERATURE: 98.5 F | DIASTOLIC BLOOD PRESSURE: 81 MMHG

## 2023-12-16 DIAGNOSIS — S62.652A CLOSED NONDISPLACED FRACTURE OF MIDDLE PHALANX OF RIGHT MIDDLE FINGER, INITIAL ENCOUNTER: Primary | ICD-10-CM

## 2023-12-16 DIAGNOSIS — S69.91XA INJURY OF RIGHT HAND, INITIAL ENCOUNTER: ICD-10-CM

## 2023-12-16 PROCEDURE — 73140 X-RAY EXAM OF FINGER(S): CPT

## 2023-12-16 PROCEDURE — 29130 APPL FINGER SPLINT STATIC: CPT | Performed by: NURSE PRACTITIONER

## 2023-12-16 PROCEDURE — 99213 OFFICE O/P EST LOW 20 MIN: CPT | Performed by: NURSE PRACTITIONER
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
## 2023-12-16 NOTE — PATIENT INSTRUCTIONS
I do see a very tiny avulsion at the base of the second phalanx, lateral aspect (2nd view). Use the finger splint as if it is a cast removing only briefly for hand hygiene and follow-up with ortho. Finger Fracture   AMBULATORY CARE:   A finger fracture  is a break in one or more of the bones in your finger. Common signs and symptoms include the following:   Pain, bruising, or swelling    Weakness or numbness    Trouble moving your finger    Finger shape is not normal    Seek care immediately if:   Your cast or splint gets wet, damaged, or comes off. Your splint or cast feels too tight. You have severe pain. Your injured finger is numb, cold, or pale. Call your doctor or hand specialist if:   Your pain or swelling gets worse, even after treatment. You have questions or concerns about your condition or care. Treatment  may include any of the following:  A cast or splint  helps prevent movement and protects your finger so it can heal.    NSAIDs , such as ibuprofen, help decrease swelling, pain, and fever. This medicine is available with or without a doctor's order. NSAIDs can cause stomach bleeding or kidney problems in certain people. If you take blood thinner medicine, always ask your healthcare provider if NSAIDs are safe for you. Always read the medicine label and follow directions. Acetaminophen  decreases pain and fever. It is available without a doctor's order. Ask how much to take and how often to take it. Follow directions. Read the labels of all other medicines you are using to see if they also contain acetaminophen, or ask your doctor or pharmacist. Acetaminophen can cause liver damage if not taken correctly. Prescription pain medicine  may be given. Ask your healthcare provider how to take this medicine safely. Some prescription pain medicines contain acetaminophen. Do not take other medicines that contain acetaminophen without talking to your healthcare provider.  Too much acetaminophen may cause liver damage. Prescription pain medicine may cause constipation. Ask your healthcare provider how to prevent or treat constipation. Closed reduction  is used to put your bone back into the correct position without surgery. Open reduction surgery  may be needed to put your bone back into the correct position. Wires, pins, plates, or screws may be used to keep the broken pieces lined up correctly. Self-care:   Wear your splint as directed. Do not remove your splint until you follow up with your healthcare provider or hand specialist.    Apply ice  on your finger for 15 to 20 minutes every hour or as directed. Use an ice pack, or put crushed ice in a plastic bag. Cover it with a towel before you apply it to your skin. Ice helps prevent tissue damage and decreases swelling and pain. Elevate  your finger above the level of your heart as often as you can. This will help decrease swelling and pain. Prop your hand on pillows or blankets to keep it elevated comfortably. Go to physical therapy as directed. A physical therapist teaches you exercises to help improve movement and strength, and to decrease pain. Follow up with your doctor or hand specialist within 2 days:  Write down your questions so you remember to ask them during your visits. © Copyright Gigi Wiley 2023 Information is for End User's use only and may not be sold, redistributed or otherwise used for commercial purposes. The above information is an  only. It is not intended as medical advice for individual conditions or treatments. Talk to your doctor, nurse or pharmacist before following any medical regimen to see if it is safe and effective for you.

## 2023-12-19 ENCOUNTER — OFFICE VISIT (OUTPATIENT)
Dept: OBGYN CLINIC | Facility: CLINIC | Age: 23
End: 2023-12-19
Payer: COMMERCIAL

## 2023-12-19 VITALS — BODY MASS INDEX: 22.26 KG/M2 | WEIGHT: 179 LBS | HEIGHT: 75 IN

## 2023-12-19 DIAGNOSIS — S62.652A CLOSED NONDISPLACED FRACTURE OF MIDDLE PHALANX OF RIGHT MIDDLE FINGER, INITIAL ENCOUNTER: Primary | ICD-10-CM

## 2023-12-19 PROCEDURE — 26720 TREAT FINGER FRACTURE EACH: CPT | Performed by: ORTHOPAEDIC SURGERY

## 2023-12-19 PROCEDURE — 99203 OFFICE O/P NEW LOW 30 MIN: CPT | Performed by: ORTHOPAEDIC SURGERY
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
## 2024-01-31 ENCOUNTER — TELEPHONE (OUTPATIENT)
Dept: PSYCHIATRY | Facility: CLINIC | Age: 24
End: 2024-01-31
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
## 2024-02-01 NOTE — TELEPHONE ENCOUNTER
Hi Clinical team,     Can we please gather more information regarding Leonardo's concerns? We can restart medications, if he is interested. Outreach was attempted back in May 2023 to schedule a follow-up visit in July/August but unfortunately, this call was never returned. This may be the cause of the psychiatric instability at the moment.

## 2024-02-01 NOTE — TELEPHONE ENCOUNTER
"Called and spoke with patient who advised he stopped taking medication due to \"the crashes\". He was taking 40mg Prozac and decreased it back to 20mg to keep it in his system that he says he has still.    Scheduled for 3/28 2pm Virtual but patient would be open to sooner appt. Advised of [facility] if needed. Patient thanked staff for the call. Writer will call patient if sooner appt available. Please advise.  "

## 2024-02-05 NOTE — TELEPHONE ENCOUNTER
Called and spoke with patient to offer sooner appt 2/15 2PM virtual. Patient accepted. Scheduled and kept 3/28 2PM virtual for follow up. Advised note can be provided once provider approves request. Patient thanked staff.

## 2024-02-05 NOTE — TELEPHONE ENCOUNTER
Called and left message for patient to inquire how he would like letter requested sent to him. Please inform.

## 2024-02-05 NOTE — TELEPHONE ENCOUNTER
Leonardo Montenegro is requesting a letter/form for an excuse for school. Patient asked for this excuse do to medication not working anymore.    Date due: asap    Special notes: Patient asked for excuse for the dates of 1/31/2024 and 2/5/2024. Patient stated it was because of the medication.

## 2024-02-05 NOTE — TELEPHONE ENCOUNTER
Letter completed. Will F/U with Leonardo on Thursday, 2/15/24 regarding questions about medications.

## 2024-02-20 ENCOUNTER — TELEPHONE (OUTPATIENT)
Dept: PSYCHIATRY | Facility: CLINIC | Age: 24
End: 2024-02-20

## 2024-03-01 ENCOUNTER — TELEPHONE (OUTPATIENT)
Dept: OTHER | Facility: OTHER | Age: 24
End: 2024-03-01

## 2024-03-04 NOTE — TELEPHONE ENCOUNTER
Returned call to patient and informed next appt is scheduled 3/28 2PM virtual via text. Provided office number if needed.

## 2024-03-06 ENCOUNTER — TELEPHONE (OUTPATIENT)
Dept: OTHER | Facility: OTHER | Age: 24
End: 2024-03-06

## 2024-03-06 NOTE — TELEPHONE ENCOUNTER
Patient called saying that he would like for 3/6 to be added to his missed sick days and is asking if the office can give him a call regarding the matter.

## 2024-03-07 NOTE — TELEPHONE ENCOUNTER
Called and spoke with patient regarding message and missed appt 2/19. Patient stated he had gotten sick unexpectedly and then slept for the remainder of the day through the appointment. He informed writer he has been missing classes due to these episodes that he confirmed happens often. He said that he was decreased from 40mg to 20mg of Prozac and it made everything worse. He confirms he is still taking 20mg Prozac and no other medication. Writer will print letter from 2/5 excusing patient from classes.    Patient asked for another note for an excuse from 3/6. Please advise. Scheduled follow up 5/23 230PM virtual and added to cancellation list. Stressed to patient that is was important to attend scheduled visits. Patient expressed verbal understanding and thanked staff for the call.

## 2024-03-07 NOTE — TELEPHONE ENCOUNTER
Printed letter previously completed letter from provider on 2/5 for excuse and emailed to email on file which patient confirmed was his mothers. Clinical staff will follow up regarding medications and excuse for 3/6.

## 2024-03-07 NOTE — TELEPHONE ENCOUNTER
Chart and concerns reviewed. If he wants to further optimize Prozac back to 40mg, I'm fine with that. If he wants to consider cross-tapering to another, new medication, he needs to be evaluated. Unfortunately, he no-showed his last visit 2 weeks ago.     As for the excuse letter, I cannot provide one as I did not evaluate him on that date or during that time frame. I apologize.

## 2024-03-08 NOTE — TELEPHONE ENCOUNTER
"Spoke with Leonardo and reviewed feedback.direction of Dr. Aguilera. He relates in January he started feeling like the medication, Prozac, was not working. He decreased the dose to 20 mg around 2/5/24 and was okay for about 2 weeks. He had the 20 mg caps from a previous prescription. Now he's feeling worse than when he was taking the 40 mg. He said conversation is \"draining\" and work is strenuous. He has trouble keeping food down after he takes the medication. He describes having \"brain fog\" and difficulty focusing. He denies SI.     Leonardo says he doesn't feel like he's depressed, he has too many good things going on. He did say he has some family stuff going on and relates a series of deaths near him, beginning with his best friend dying a year ago followed by his grandmother, great aunt, great uncle and then his grandfather.     Leonardo also reported he \"passes out.\" It's happened while he's walking, while he's sitting down and even in class. He said he can feel when it might happen and he will lay down when he can. With further questioning, he said he is in a deep sleep - his friend \"thought he was dead.\"     We talked about his last visit to a PCP for evaluation to rule out any underlying causes of fatigue. Leonardo said he was supposed to see sleep medicine, but then Covid happened and he has not followed up on that. He verbalized understanding of why seeing a medical practitioner was important. Leonardo said he saw a therapist before, but not now.  Leonardo said he is aware of [facility], in [place], but he is at school, [place] now. He said he saw a therapist before, but not now. He was encouraged to check into supportive services the school may have.    Reiterated increasing Prozac back to 40 mg, offered to check for adding him to a cancellation list for a sooner appointment with Dr. Aguilera and he was given the nursing number to call as needed.       "

## 2024-03-09 DIAGNOSIS — F41.1 GENERALIZED ANXIETY DISORDER: ICD-10-CM

## 2024-03-09 RX ORDER — FLUOXETINE HYDROCHLORIDE 40 MG/1
40 CAPSULE ORAL DAILY
Qty: 30 CAPSULE | Refills: 0 | Status: SHIPPED | OUTPATIENT
Start: 2024-03-09

## 2024-03-09 NOTE — TELEPHONE ENCOUNTER
"Chart and concerns reviewed. I agree with RN's assessment that Leonardo should follow-up with other medical professionals (ie PCP, sleep medicine) for physical concerns (anergia, \"passing out\", brain fog, GI issues, etc.) and his therapist, for ongoing struggles related to grief, academic demands, and other life stressors. I appreciate the encouragement for Leonardo to fully adhere to treatment recommendations and goals of following up when clinic contacts him and avoiding no-shows. New script for Prozac 40mg sent to pharmacy today."

## 2024-03-11 ENCOUNTER — OFFICE VISIT (OUTPATIENT)
Dept: FAMILY MEDICINE CLINIC | Facility: CLINIC | Age: 24
End: 2024-03-11
Payer: COMMERCIAL

## 2024-03-11 ENCOUNTER — LAB (OUTPATIENT)
Age: 24
End: 2024-03-11
Payer: COMMERCIAL

## 2024-03-11 VITALS
WEIGHT: 177.6 LBS | HEART RATE: 88 BPM | HEIGHT: 75 IN | OXYGEN SATURATION: 96 % | BODY MASS INDEX: 22.08 KG/M2 | DIASTOLIC BLOOD PRESSURE: 74 MMHG | TEMPERATURE: 97.1 F | SYSTOLIC BLOOD PRESSURE: 118 MMHG

## 2024-03-11 DIAGNOSIS — R53.83 OTHER FATIGUE: ICD-10-CM

## 2024-03-11 DIAGNOSIS — G47.09 OTHER INSOMNIA: ICD-10-CM

## 2024-03-11 DIAGNOSIS — J02.9 SORE THROAT: ICD-10-CM

## 2024-03-11 DIAGNOSIS — F41.1 GENERALIZED ANXIETY DISORDER: ICD-10-CM

## 2024-03-11 DIAGNOSIS — J02.9 SORE THROAT: Primary | ICD-10-CM

## 2024-03-11 LAB
ALBUMIN SERPL BCP-MCNC: 4.6 G/DL (ref 3.5–5)
ALP SERPL-CCNC: 60 U/L (ref 34–104)
ALT SERPL W P-5'-P-CCNC: 16 U/L (ref 7–52)
ANION GAP SERPL CALCULATED.3IONS-SCNC: 9 MMOL/L
AST SERPL W P-5'-P-CCNC: 17 U/L (ref 13–39)
BASOPHILS # BLD AUTO: 0.03 THOUSANDS/ÂΜL (ref 0–0.1)
BASOPHILS NFR BLD AUTO: 1 % (ref 0–1)
BILIRUB SERPL-MCNC: 1.12 MG/DL (ref 0.2–1)
BUN SERPL-MCNC: 15 MG/DL (ref 5–25)
CALCIUM SERPL-MCNC: 9.4 MG/DL (ref 8.4–10.2)
CHLORIDE SERPL-SCNC: 104 MMOL/L (ref 96–108)
CO2 SERPL-SCNC: 27 MMOL/L (ref 21–32)
CREAT SERPL-MCNC: 1.07 MG/DL (ref 0.6–1.3)
EOSINOPHIL # BLD AUTO: 0.09 THOUSAND/ÂΜL (ref 0–0.61)
EOSINOPHIL NFR BLD AUTO: 2 % (ref 0–6)
ERYTHROCYTE [DISTWIDTH] IN BLOOD BY AUTOMATED COUNT: 12.4 % (ref 11.6–15.1)
GFR SERPL CREATININE-BSD FRML MDRD: 98 ML/MIN/1.73SQ M
GLUCOSE P FAST SERPL-MCNC: 80 MG/DL (ref 65–99)
HCT VFR BLD AUTO: 46.2 % (ref 36.5–49.3)
HETEROPH AB SER QL: NEGATIVE
HGB BLD-MCNC: 15.4 G/DL (ref 12–17)
IMM GRANULOCYTES # BLD AUTO: 0.02 THOUSAND/UL (ref 0–0.2)
IMM GRANULOCYTES NFR BLD AUTO: 1 % (ref 0–2)
LYMPHOCYTES # BLD AUTO: 0.94 THOUSANDS/ÂΜL (ref 0.6–4.47)
LYMPHOCYTES NFR BLD AUTO: 24 % (ref 14–44)
MCH RBC QN AUTO: 29.9 PG (ref 26.8–34.3)
MCHC RBC AUTO-ENTMCNC: 33.3 G/DL (ref 31.4–37.4)
MCV RBC AUTO: 90 FL (ref 82–98)
MONOCYTES # BLD AUTO: 0.4 THOUSAND/ÂΜL (ref 0.17–1.22)
MONOCYTES NFR BLD AUTO: 10 % (ref 4–12)
NEUTROPHILS # BLD AUTO: 2.48 THOUSANDS/ÂΜL (ref 1.85–7.62)
NEUTS SEG NFR BLD AUTO: 62 % (ref 43–75)
NRBC BLD AUTO-RTO: 0 /100 WBCS
PLATELET # BLD AUTO: 319 THOUSANDS/UL (ref 149–390)
PMV BLD AUTO: 9.2 FL (ref 8.9–12.7)
POTASSIUM SERPL-SCNC: 4.6 MMOL/L (ref 3.5–5.3)
PROT SERPL-MCNC: 7.2 G/DL (ref 6.4–8.4)
RBC # BLD AUTO: 5.15 MILLION/UL (ref 3.88–5.62)
S PYO DNA THROAT QL NAA+PROBE: NOT DETECTED
SODIUM SERPL-SCNC: 140 MMOL/L (ref 135–147)
TSH SERPL DL<=0.05 MIU/L-ACNC: 0.97 UIU/ML (ref 0.45–4.5)
WBC # BLD AUTO: 3.96 THOUSAND/UL (ref 4.31–10.16)

## 2024-03-11 PROCEDURE — 86665 EPSTEIN-BARR CAPSID VCA: CPT

## 2024-03-11 PROCEDURE — 87651 STREP A DNA AMP PROBE: CPT | Performed by: NURSE PRACTITIONER

## 2024-03-11 PROCEDURE — 86664 EPSTEIN-BARR NUCLEAR ANTIGEN: CPT

## 2024-03-11 PROCEDURE — 84443 ASSAY THYROID STIM HORMONE: CPT

## 2024-03-11 PROCEDURE — 36415 COLL VENOUS BLD VENIPUNCTURE: CPT

## 2024-03-11 PROCEDURE — 85025 COMPLETE CBC W/AUTO DIFF WBC: CPT

## 2024-03-11 PROCEDURE — 86308 HETEROPHILE ANTIBODY SCREEN: CPT

## 2024-03-11 PROCEDURE — 86663 EPSTEIN-BARR ANTIBODY: CPT

## 2024-03-11 PROCEDURE — 99204 OFFICE O/P NEW MOD 45 MIN: CPT | Performed by: NURSE PRACTITIONER

## 2024-03-11 PROCEDURE — 80053 COMPREHEN METABOLIC PANEL: CPT

## 2024-03-11 NOTE — PROGRESS NOTES
Name: Leonardo Montenegro      : 2000      MRN: [id number]  Encounter Provider: FRANKLYN Hardy  Encounter Date: 3/11/2024   Encounter department: [facility]    Assessment & Plan     1. Sore throat  -     POCT rapid PCR strepA  -     Mononucleosis screen; Future  -     EBV acute panel; Future    2. Other fatigue  Comments:  I doubt he has sleep apnea .  Will start with labs.  Will try to improve sleep, consider sleep study if no better  Orders:  -     CBC and differential; Future  -     Comprehensive metabolic panel; Future  -     TSH, 3rd generation with Free T4 reflex; Future  -     EBV acute panel; Future    3. Generalized anxiety disorder  Comments:  Fairly stable but a little bit worse due to the recent deaths in his family I think.  Discussed the difference between anxiety depression and grief    4. Other insomnia  Comments:  Try Tylenol PM 2-3 times a week and see if improving his sleep improves his energy.           Subjective      Days ago started with a cough productive for brown mucus.  And a sore throat.  Mom with similar.  After he left I did see mom and she ended up having a positive strep but this is something that has been recurrent for her as an adult.  His symptoms are fairly mild he just wanted to be proactive as sometimes he ends up feeling sicker before he gets better.  He also inquired about his energy level.  Feels fatigued throughout the day sometimes falling asleep quite easily.  His psychiatrist was wondering if he should be tested for him feeling very tired all the time including somnolence.  He is not aware that he snores very loudly or regularly.  I later asked his mom and she did not think that he really was a big snorer but he did have a history of sleep talking or walking when he was a kid.  He admits that he is having trouble sleeping he will he will wake up several times during the night.  I asked about anxiety and apparently he has had a rough year  losing both his grandparents and also several friends really quite a few ironically have  of mostly natural causes or car accident type issues.  He is doing okay but he stated his cancer wanted to know if he had any issues otherwise he might think the medicine was keeping him awake.    Sore Throat       Review of Systems   HENT:  Positive for sore throat.        Current Outpatient Medications on File Prior to Visit   Medication Sig   • ADVAIR HFA 45-21 MCG/ACT inhaler     • albuterol (ProAir HFA) 90 mcg/act inhaler Inhale 2 puffs every 6 (six) hours as needed for wheezing   • cetirizine-pseudoephedrine (ZyrTEC-D) 5-120 MG per tablet Take 1 tablet by mouth 2 (two) times a day as needed     • FLUoxetine (PROzac) 40 MG capsule Take 1 capsule (40 mg total) by mouth daily   • fluticasone (FLONASE) 50 mcg/act nasal spray into each nostril 2 (two) times a day as needed     • Multiple Vitamin (MULTI-DAY VITAMINS PO) daily   • Riboflavin (VITAMIN B-2) 100 MG TABS TAKE 1 TABLET BY MOUTH TWICE DAILY   • albuterol (PROVENTIL HFA,VENTOLIN HFA) 90 mcg/act inhaler Inhale 1 puff every 6 (six) hours (Patient not taking: Reported on 2023)   • benzonatate (TESSALON) 200 MG capsule Take 1 capsule (200 mg total) by mouth 3 (three) times a day as needed for cough (Patient not taking: Reported on 2023)   • ipratropium-albuterol (DUO-NEB) 0.5-2.5 mg/3 mL nebulizer solution Take 3 mL by nebulization 4 (four) times a day (Patient not taking: Reported on 2023)   • ipratropium-albuterol (DUO-NEB) 0.5-2.5 mg/3 mL Inhale (Patient not taking: Reported on 2023)   • levalbuterol (XOPENEX HFA) 45 mcg/act inhaler Inhale 1-2 puffs every 4 (four) hours as needed for wheezing.   • methylPREDNISolone 4 MG tablet therapy pack Use as directed on package (Patient not taking: Reported on 2023)   • ondansetron (ZOFRAN) 4 mg tablet Take 1 tablet (4 mg total) by mouth every 6 (six) hours (Patient not taking: Reported on 2023)  "      Objective     /74 (BP Location: Left arm, Patient Position: Sitting, Cuff Size: Large)   Pulse 88   Temp (!) 97.1 °F (36.2 °C) (Tympanic)   Ht 6' 3\" (1.905 m)   Wt 80.6 kg (177 lb 9.6 oz)   SpO2 96%   BMI 22.20 kg/m²     Physical Exam  Constitutional:       Appearance: He is well-developed.   HENT:      Right Ear: Tympanic membrane normal.      Nose: Congestion present. No rhinorrhea.      Mouth/Throat:      Pharynx: Posterior oropharyngeal erythema (Minimal) present. No oropharyngeal exudate.   Cardiovascular:      Rate and Rhythm: Normal rate and regular rhythm.   Pulmonary:      Effort: Pulmonary effort is normal.      Breath sounds: Normal breath sounds.   Lymphadenopathy:      Cervical: Cervical adenopathy (Mild anterior cervical lymphadenopathy) present.   Neurological:      Mental Status: He is alert.   Psychiatric:      Comments: STable for the most part but I still wonder if things are not running through his mind keeping him awake at night.       FRANKLYN Hardy    "

## 2024-03-11 NOTE — TELEPHONE ENCOUNTER
Called Leonardo and left a VM:   Dr. Aguilera sent a prescription for the 40 mg medication to [facility] in [place]; call nursing if he would need it sent to a pharmacy closer to where he is.

## 2024-03-12 LAB
EBV NA IGG SER IA-ACNC: 41.9 U/ML (ref 0–17.9)
EBV VCA IGG SER IA-ACNC: 168 U/ML (ref 0–17.9)
EBV VCA IGM SER IA-ACNC: <36 U/ML (ref 0–35.9)
INTERPRETATION: ABNORMAL

## 2024-03-13 DIAGNOSIS — D72.818 OTHER DECREASED WHITE BLOOD CELL (WBC) COUNT: Primary | ICD-10-CM

## 2024-03-13 DIAGNOSIS — R17 ELEVATED BILIRUBIN: ICD-10-CM

## 2024-03-13 NOTE — RESULT ENCOUNTER NOTE
Notify patient his labs show a past infection of mono not a current infection.  His bilirubin was slightly elevated and we will recheck that in a month.  It is most likely just a reaction to the virus. his blood count was slightly off and that is probably due to a different virus we will recheck in a month to make sure that he normalizes.  I believe his energy will improve by then as well and if not we will consider sleep study.  Does not think he snores but she does note that he is to sleep talk so he could have another sleep disorder and I would consider an actual sleep specialist and that event.

## 2024-04-02 DIAGNOSIS — F41.1 GENERALIZED ANXIETY DISORDER: ICD-10-CM

## 2024-04-07 RX ORDER — FLUOXETINE HYDROCHLORIDE 40 MG/1
40 CAPSULE ORAL DAILY
Qty: 90 CAPSULE | Refills: 1 | Status: SHIPPED | OUTPATIENT
Start: 2024-04-07

## 2024-05-23 ENCOUNTER — TELEMEDICINE (OUTPATIENT)
Dept: PSYCHIATRY | Facility: CLINIC | Age: 24
End: 2024-05-23

## 2024-05-23 DIAGNOSIS — Z79.899 MEDICAL CANNABIS USE: ICD-10-CM

## 2024-05-23 DIAGNOSIS — F63.81 INTERMITTENT EXPLOSIVE DISORDER IN ADULT: ICD-10-CM

## 2024-05-23 DIAGNOSIS — F41.1 GENERALIZED ANXIETY DISORDER: Primary | ICD-10-CM

## 2024-05-23 RX ORDER — FLUOXETINE HYDROCHLORIDE 40 MG/1
40 CAPSULE ORAL DAILY
Qty: 90 CAPSULE | Refills: 2 | Status: SHIPPED | OUTPATIENT
Start: 2024-05-23

## 2024-05-23 NOTE — PSYCH
MEDICATION MANAGEMENT NOTE        [facility]      Name and Date of Birth:  Leonardo Montenegro 23 y.o. 2000 MRN: [id number]    Date of Visit: May 23, 2024    Reason for Visit: Follow-up visit for medication management     Virtual Visit Disclaimer:       TeleMed provider: iVjay Aguilera MD.   Location: [place]     Verification of patient location:     Patient is currently located in the state [facility]  Patient is currently located in a state in which I am licensed     After connecting through [employer], the patient was identified by name and date of birth.  Leonardo Montenegro was informed that this is a telemedicine visit that is being conducted through [employer], and the patient was informed that this is a secure, HIPAA-compliant platform. My office door was closed. No one else was in the room. Leonardo Montenegro acknowledged consent and understanding of privacy and security of the video platform. Leonardo understands that the online visit is based solely on information provided by the patient, and that, in the absence of a face-to-face physical evaluation by the physician, the diagnosis Leonardo  receives is both limited and provisional in terms of accuracy and completeness. Leonardo Montenegro understands that they can discontinue the visit at any time. I informed Leonardo that I have reviewed their record in [facility] and presented the opportunity for them to ask any questions regarding the visit today. Leonardo Montenegro voiced understanding and consented to these terms. Leonardo is aware this is a billable service. Leonardo is present in his car in [place].      SUBJECTIVE:    Leonardo Montenegro is a 23 y.o. male with past psychiatric history significant for generalized anxiety disorder, OCD, intermittent explosive disorder, insomnia and cannabis use (medical and recreational) who was personally seen and evaluated today at the [facility] outpatient clinic for follow-up and medication  "management. Leonardo presents as pleasant and cooperative. His thoughts are linear and organized. He completes assessment without difficulty.     Leonardo endorses compliance with psychotropic medication regimen consisting of Prozac 40mg Daily. He denies adverse medication side effects, aside from fatigue and amotivation. I do not suspect this is related to Prozac as this has been a chronic issue. Recent labs ordered by PCP were reviewed today and were mostly benign. They did mention possibly completing sleep study, which I agree with. Leonardo will follow-up with that office for a referral. I also suspect Leonardo's use of cannabis contributes to his anergia. He was vaping virtually during our session today, which is problematic. He was counseled regarding this. Acutely, Leonardo reports \"no depression\". His appetite is stable. He denies SI/HI. His concentration, focus, and productivity at work is adequate. He graduated from college in May and was applauded for his commitment to his academics. 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 Leonardo is without crying spells or anhedonia. He continues to have fun with his peers. He does report some sadness (not depression) secondary to the death of 2 grandparents recently. Supportive therapy provided. We'll process this more next session. Leonardo is not tense, on-edge, or restless today. No pathologic panic attacks. He does report some anxiety related to work, family dynamics now that he lives at home, and relationship issues. He continues to feel hurt and fears vulnerability secondary to his break-up one year ago. During today's examination, Leonardo does not exhibit objective evidence of nilda/hypomania or psychosis. Leonardo is not currently irritable, grandiose, labile, or pathologically euphoric. Leonardo is without perceptual disturbances, such as A/V hallucinations, paranoia, ideas of reference, or delusional beliefs. Leonardo denies recent ETOH or illicit substance abuse, " aside from cannabis. Leonardo offers no further concerns.     Current Rating Scores:     None completed today.    Review Of Systems:      Constitutional feeling tired, low energy, and as noted in HPI   ENT negative   Cardiovascular negative   Respiratory negative   Gastrointestinal negative   Genitourinary negative   Musculoskeletal negative   Integumentary negative   Neurological negative   Endocrine negative   Other Symptoms none, all other systems are negative       Past Psychiatric History: (unchanged information from previous note copied and italicized) - Information that is bolded has been updated.      Inpatient psychiatric admissions: Denies  Prior outpatient psychiatric linkage: Denies  Past/current psychotherapy: No longer linked with Nahomi Varner (psychotherapy)   History of suicidal attempts/gestures: Denies  History of violence/aggressive behaviors: Yes, towards parents, peers and authoritative figures  Psychotropic medication trials: Paxil (D/C because of fatigue), Prozac (now), Hydroxyzine  Substance abuse inpatient/outpatient rehabilitation: Denies     Substance Abuse History: (unchanged information from previous note copied and italicized) - Information that is bolded has been updated.      No history of ETOH or tobacco abuse. However, patient does use cannabis daily. No past legal actions or arrests secondary to substance intoxication. The patient denies prior DWIs/DUIs. No history of outpatient/inpatient rehabilitation programs. Leonardo does not exhibit objective evidence of substance withdrawal during today's examination nor does Leonardo appear under the influence of any psychoactive substance.          Social History: (unchanged information from previous note copied and italicized) - Information that is bolded has been updated.      Developmental: Denies a history of milestone/developmental delay. Denies a history of in-utero exposure to toxins/illicit substances. There is no documented history of IEP  or need for special education.  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 Leonardo Montenegro has no history of arrests or violence with a deadly weapon.      Traumatic History: (unchanged information from previous note copied and italicized) - Information that is bolded has been updated.      Abuse:none is reported  Other Traumatic Events: Denies    Past Medical History:    Past Medical History:   Diagnosis Date    Acute appendicitis 3/11/2022    Allergic     enviromental    Asthma     Concussion     Sprain and strain of left wrist         Past Surgical History:   Procedure Laterality Date    APPENDECTOMY  03/12/2022    APPENDECTOMY LAPAROSCOPIC N/A 3/12/2022    Procedure: APPENDECTOMY LAPAROSCOPIC;  Surgeon: Cezar Metzger MD;  Location: [facility];  Service: General    LYMPH NODE DISSECTION  2010    MYRINGOTOMY W/ TUBES       No Known Allergies    Substance Abuse History:    Social History     Substance and Sexual Activity   Alcohol Use Never     Social History     Substance and Sexual Activity   Drug Use Never       Social History:    Social History     Socioeconomic History    Marital status: Single     Spouse name: Not on file    Number of children: Not on file    Years of education: Not on file    Highest education level: Not on file   Occupational History    Not on file   Tobacco Use    Smoking status: Never    Smokeless tobacco: Never   Vaping Use    Vaping status: Some Days    Substances: THC, CBD   Substance and Sexual Activity    Alcohol use: Never    Drug use: Never    Sexual activity: Not Currently     Partners: Female   Other Topics Concern    Not on file   Social History Narrative    Not on file     Social Determinants of Health      Financial Resource Strain: Not on file   Food Insecurity: Not on file   Transportation Needs: Not on file   Physical Activity: Not on file   Stress: Not on file   Social Connections: Not on file   Intimate Partner Violence: Not on file   Housing Stability: Not on file       Family Psychiatric History:     Family History   Problem Relation Age of Onset    No Known Problems Father        History Review: The following portions of the patient's history were reviewed and updated as appropriate: allergies, current medications, past family history, past medical history, past social history, past surgical history, and problem list.         OBJECTIVE:     Vital signs in last 24 hours:    There were no vitals filed for this visit.    Mental Status Evaluation:    Appearance age appropriate, casually dressed, dressed appropriately, looks stated age   Behavior pleasant, cooperative, calm, good eye contact   Speech normal rate, normal volume, normal pitch   Mood euthymic   Affect normal range and intensity, appropriate   Thought Processes organized, goal directed, normal rate of thoughts, normal abstract reasoning   Associations intact associations   Thought Content no overt delusions   Perceptual Disturbances: no auditory hallucinations, no visual hallucinations   Abnormal Thoughts  Risk Potential Suicidal ideation - None at present  Homicidal ideation - None at present  Potential for aggression - No   Orientation oriented to person, place, and time/date   Memory recent and remote memory grossly intact   Consciousness alert and awake   Attention Span Concentration Span attention span and concentration are age appropriate   Intellect appears to be of average intelligence   Insight intact and good   Judgement intact and good   Muscle Strength and  Gait unable to assess today due to virtual visit   Motor activity no abnormal movements   Language no difficulty naming common objects   Fund of Knowledge adequate knowledge of  current events   Pain none   Pain Scale Did not ask patient to formally rate       Laboratory Results: I have personally reviewed all pertinent laboratory/tests results    Recent Labs (last 2 months):   No visits with results within 2 Month(s) from this visit.   Latest known visit with results is:   Lab on 03/11/2024   Component Date Value    WBC 03/11/2024 3.96 (L)     RBC 03/11/2024 5.15     Hemoglobin 03/11/2024 15.4     Hematocrit 03/11/2024 46.2     MCV 03/11/2024 90     MCH 03/11/2024 29.9     MCHC 03/11/2024 33.3     RDW 03/11/2024 12.4     MPV 03/11/2024 9.2     Platelets 03/11/2024 319     nRBC 03/11/2024 0     Segmented % 03/11/2024 62     Immature Grans % 03/11/2024 1     Lymphocytes % 03/11/2024 24     Monocytes % 03/11/2024 10     Eosinophils Relative 03/11/2024 2     Basophils Relative 03/11/2024 1     Absolute Neutrophils 03/11/2024 2.48     Absolute Immature Grans 03/11/2024 0.02     Absolute Lymphocytes 03/11/2024 0.94     Absolute Monocytes 03/11/2024 0.40     Eosinophils Absolute 03/11/2024 0.09     Basophils Absolute 03/11/2024 0.03     Sodium 03/11/2024 140     Potassium 03/11/2024 4.6     Chloride 03/11/2024 104     CO2 03/11/2024 27     ANION GAP 03/11/2024 9     BUN 03/11/2024 15     Creatinine 03/11/2024 1.07     Glucose, Fasting 03/11/2024 80     Calcium 03/11/2024 9.4     AST 03/11/2024 17     ALT 03/11/2024 16     Alkaline Phosphatase 03/11/2024 60     Total Protein 03/11/2024 7.2     Albumin 03/11/2024 4.6     Total Bilirubin 03/11/2024 1.12 (H)     eGFR 03/11/2024 98     TSH 3RD GENERATON 03/11/2024 0.975     Monotest 03/11/2024 Negative     EBV VCA IgG 03/11/2024 168.0 (H)     EBV VCA IgM 03/11/2024 <36.0     EBV Nuclear Ag Ab 03/11/2024 41.9 (H)     INTERPRETATION 03/11/2024 Comment        Suicide/Homicide Risk Assessment:    The following interventions are recommended: no intervention changes needed      Lethality Statement:    Based on today's assessment and clinical criteria, Leonardo  "THERESA Montenegro contracts for safety and is not an imminent risk of harm to self or others. Outpatient level of care is deemed appropriate at this current time. Leoanrdo understands that if they can no longer contract for safety, they need to call the office or report to their nearest Emergency Room for immediate evaluation.      Assessment/Plan:      Leonardo Montenegro is a 23 y.o. male with past psychiatric history significant for generalized anxiety disorder, OCD, intermittent explosive disorder, insomnia and cannabis use (medical and recreational) who was evaluated today at the [facility] outpatient clinic for follow-up and medication management. Leonardo is currently linked with Nahomi Varner for individualized psychotherapy of which he admits to be beneficial. Leonardo states that he has a longstanding history of intermittent explosive disorder and oppositionality. In an impressionistic manner, he is braggadocios regarding past events in which he verbally attacked his parents, peers, and authoritative figures (like teachers). He speaks at length about \"getting money\" and how he only cares about \"his fam, marijuana, and cash\". A review of documentation reveals a past history of anger outbursts, vindictiveness, mood lability which appears purposeful. Leonardo is proud to share that he often \"puts people in their place\" when confronted. He hypothesizes this is secondary to \"nilda\" and hints at a diagnostic history of nilda/hypomania. However, upon extensive questioning, Leonardo has no diagnostic indicators suggestive of an underlying bipolar chemistry. He is able to act appropriately at work ([employer]) and has never been suspended or expelled from school in the past, suggestive of his ability to \"switch it on and off\" willfully. Leonardo admits to daily cannabis abuse, stating \"it's the only thing\" that manages his anxiety. He states that his excessive worry and nervousness began in HS and has intensified over the last " "few years. He has no prior psychotropic medication trials but states that therapy has been helpful. During intake assessment, he laughed and appeared at ease, however, he completed a HAVEN-7 questionnaire which resulted in a score of 18. His answers and inflammatory statements are incongruent with his affect. During intake, Leonardo endorsed several neurovegetative symptoms of depression, such as poor sleep and limited energy. Leonardo speaks at length about his \"rule of 6's\" today, stating that he often has \" 6 thoughts\" in his mind at all times. He perseverates on the need for even numbers and is rigid and inflexible when \"things don't go his way\". He states that he experiences intrusive and persistent thoughts and behaves in a manner to neutralize these thoughts. He finds that he only eats even number foods (ie 4 or 6 fries at one time) and will have to touch a crack in a sidewalk with his left food if he touched it with his right foot. He denies any signs/symptoms suggestive of PTSD or disordered eating.      Today's Plan:     Psychopharmacologically, Leonardo reports benefit and tolerability with Prozac. He denies interest in starting medications (ie Wellbutrin) for energy deficits. I do suspect this is related to cannabis use. He wants to complete sleep study as recommended by his PCP and thus, he will reach out to their service.         DSM-V Diagnoses:      1.) Generalized Anxiety Disorder  2.) OCD  3.) Intermittent Explosive Disorder               -R/O ODD  4.) Cannabis Use (medical and recreational)  5.) Insomnia   6.) Alcohol abuse - resolved        Treatment Recommendations/Precautions:      1.) Generalized Anxiety Disorder  - Continue Prozac 40mg Dailly        2.) OCD  - Continue Prozac 40mg Dailly        3.) Intermittent Explosive Disorder               -R/O ODD  - Continue Prozac 40mg Dailly  - Consider use of Lamictal in future         4.) Cannabis Use (medical and recreational)  - Psychoeducation provided " regarding the importance of exercise and healthy dietary choices and their impact on mood, energy, and motivation  - Counseled to avoid ETOH, illict substances, and nicotine secondary to the detrimental effects of these substances on mental and physical health  - Not willing to engage in sobriety at this juncture      5.) Insomnia   - No current tx     6.) Alcohol abuse- resolved   - Counseled to avoid ETOH, illict substances, and nicotine secondary to the detrimental effects of these substances on mental and physical health      Medication management every 4 months  Does not want to see a therapist despite recommendation  Aware of need to follow up with family physician for medical issues  Aware of 24 hour and weekend coverage for urgent situations accessed by calling [facility] main practice number    Medications Risks/Benefits      Risks, Benefits And Possible Side Effects Of Medications:    Risks, benefits, and possible side effects of medications explained to Leonardo including risk of suicidality and serotonin syndrome related to treatment with antidepressants. He verbalizes understanding and agreement for treatment.    Controlled Medication Discussion:     Not applicable    Psychotherapy Provided:     Individual psychotherapy provided: Yes  Counseling was provided during the session today for 16 minutes.  Medications, treatment progress and treatment plan reviewed with Leonardo.  Medication education provided to Leonardo.  Recent stressors discussed with Leonardo including alcohol use problems, family problems, family conflict, family issues, relationship problems, job stress, problems at work, health issues, recent medication change, everyday stressors, and occasional anxiety.  Coping skills reviewed with Leonardo.   Educated on importance of medication and treatment compliance.  Supportive therapy provided.      Treatment Plan:    Completed and signed during the session: Yes - Treatment Plan done  but not signed at time of office visit due to:  Plan reviewed by video and verbal consent given due to virtual visit.      Visit Time    Visit Start Time: 2:30 PM  Visit Stop Time: 3:05 PM  Total Visit Duration:  35 minutes     The total visit duration detailed above includes: patient engagement, medication management, psychotherapy/counseling, discussion regarding treatment goals, documentation, review of past medical records, and coordination of care.      Note Share Disclaimer:     This note was not shared with the patient due to reasonable likelihood of causing patient harm      Vijay Aguilera MD  Board Certified Diplomate of the American Board of Psychiatry and Neurology  05/23/24

## 2024-05-23 NOTE — BH TREATMENT PLAN
TREATMENT PLAN (Medication Management Only)        [facility]      Name and Date of Birth:  Leonardo Montenegro 23 y.o. 2000 MRN: [id number]    Date of Visit: May 23, 2024    Diagnosis/Diagnoses:    1. Generalized anxiety disorder        2. Intermittent explosive disorder in adult        3. Medical cannabis use            Strengths/Personal Resources for Self-Care: supportive family, supportive friends, taking medications as prescribed, ability to adapt to life changes, ability to communicate needs, ability to communicate well, ability to listen, ability to reason, ability to understand psychiatric illness, average or above intelligence, good physical health, good understanding of illness, independence, motivation for treatment, ability to negotiate basic needs, self-reliance, willingness to work on problems.    Area/Areas of need (in own words): anxiety symptoms, mood instability  1. Long Term Goal: improve control of mood.  Target Date:6 months - 11/23/2024  Person/Persons responsible for completion of goal: Leonardo  2.  Short Term Objective (s) - How will we reach this goal?:   A. Provider new recommended medication/dosage changes and/or continue medication(s): continue current medications as prescribed.  B. Keep regularly scheduled psychiatric appointments  C. Maintain adherence to psychotropic medication regimen   D. Try to respect rules/regulations of household now that he is back with family post graduation   E. Maintain appropriate dietary intake  F. Practice adequate sleep hygiene      Target Date:6 months - 11/23/2024  Person/Persons Responsible for Completion of Goal: Leonardo    Progress Towards Goals: continuing treatment    Treatment Modality: medication management every 3 months    Review due 180 days from date of this plan: 6 months - 11/23/2024  Expected length of service: ongoing treatment    My Physician/PA/NP and I have developed this plan together and  I agree to work on the goals and objectives. I understand the treatment goals that were developed for my treatment. Treatment Plan was completed with Leonardo's assistance and input throughout today's session. Leonardo consented to the information provided and documented above. Unfortunately, treatment plan was not signed at the time of this office visit secondary to issues related to signature keypad.

## 2024-05-24 ENCOUNTER — TELEPHONE (OUTPATIENT)
Dept: PSYCHIATRY | Facility: CLINIC | Age: 24
End: 2024-05-24

## 2024-08-12 ENCOUNTER — TELEPHONE (OUTPATIENT)
Dept: PSYCHIATRY | Facility: CLINIC | Age: 24
End: 2024-08-12

## 2024-08-12 NOTE — TELEPHONE ENCOUNTER
Called and left message for patient to inform 1024 was cancelled due to provider being out of the office. Requested return call to reschedule. Please schedule upon return call. Thank you.

## 2024-12-06 ENCOUNTER — OFFICE VISIT (OUTPATIENT)
Dept: FAMILY MEDICINE CLINIC | Facility: CLINIC | Age: 24
End: 2024-12-06
Payer: COMMERCIAL

## 2024-12-06 VITALS
WEIGHT: 185.6 LBS | BODY MASS INDEX: 23.08 KG/M2 | HEIGHT: 75 IN | TEMPERATURE: 96.8 F | DIASTOLIC BLOOD PRESSURE: 84 MMHG | HEART RATE: 88 BPM | SYSTOLIC BLOOD PRESSURE: 116 MMHG | OXYGEN SATURATION: 96 %

## 2024-12-06 DIAGNOSIS — R05.9 COUGH, UNSPECIFIED TYPE: Primary | ICD-10-CM

## 2024-12-06 DIAGNOSIS — J45.20 MILD INTERMITTENT ASTHMA WITHOUT COMPLICATION: ICD-10-CM

## 2024-12-06 LAB
SARS-COV-2 AG UPPER RESP QL IA: NEGATIVE
SL AMB POCT RAPID FLU A: NORMAL
SL AMB POCT RAPID FLU B: NORMAL
VALID CONTROL: NORMAL

## 2024-12-06 PROCEDURE — 87804 INFLUENZA ASSAY W/OPTIC: CPT | Performed by: NURSE PRACTITIONER

## 2024-12-06 PROCEDURE — 96372 THER/PROPH/DIAG INJ SC/IM: CPT | Performed by: NURSE PRACTITIONER

## 2024-12-06 PROCEDURE — 99213 OFFICE O/P EST LOW 20 MIN: CPT | Performed by: NURSE PRACTITIONER

## 2024-12-06 PROCEDURE — 87811 SARS-COV-2 COVID19 W/OPTIC: CPT | Performed by: NURSE PRACTITIONER

## 2024-12-06 RX ORDER — ALBUTEROL SULFATE 0.83 MG/ML
2.5 SOLUTION RESPIRATORY (INHALATION) EVERY 6 HOURS PRN
Qty: 125 ML | Refills: 1 | Status: SHIPPED | OUTPATIENT
Start: 2024-12-06

## 2024-12-06 RX ORDER — DEXAMETHASONE SODIUM PHOSPHATE 10 MG/ML
10 INJECTION INTRAMUSCULAR; INTRAVENOUS ONCE
Status: COMPLETED | OUTPATIENT
Start: 2024-12-06 | End: 2024-12-06

## 2024-12-06 RX ORDER — AZITHROMYCIN 250 MG/1
TABLET, FILM COATED ORAL
Qty: 6 TABLET | Refills: 0 | Status: SHIPPED | OUTPATIENT
Start: 2024-12-06 | End: 2024-12-11

## 2024-12-06 RX ADMIN — DEXAMETHASONE SODIUM PHOSPHATE 10 MG: 10 INJECTION INTRAMUSCULAR; INTRAVENOUS at 11:50

## 2024-12-06 NOTE — PROGRESS NOTES
"Name: Leonardo Montenegro      : 2000      MRN: [id number]  Encounter Provider: FRANKLYN Hardy  Encounter Date: 2024   Encounter department: [facility]  :  Assessment & Plan  Cough, unspecified type    Orders:    POCT Rapid Covid Ag    POCT rapid flu A and B    dexamethasone (DECADRON) injection 10 mg    azithromycin (Zithromax) 250 mg tablet; Take 2 tablets (500 mg total) by mouth daily for 1 day, THEN 1 tablet (250 mg total) daily for 4 days.    albuterol (2.5 mg/3 mL) 0.083 % nebulizer solution; Take 3 mL (2.5 mg total) by nebulization every 6 (six) hours as needed for wheezing or shortness of breath  Follow-up if unresolved  Mild intermittent asthma without complication    Orders:    albuterol (2.5 mg/3 mL) 0.083 % nebulizer solution; Take 3 mL (2.5 mg total) by nebulization every 6 (six) hours as needed for wheezing or shortness of breath           History of Present Illness     Patient with cold symptoms that started 4 days ago.  Very congested in chest.  He does have a history of asthma as well.  Do see yellow mucus.      Review of Systems       Objective   /84 (BP Location: Left arm, Patient Position: Sitting, Cuff Size: Large)   Pulse 88   Temp (!) 96.8 °F (36 °C) (Tympanic)   Ht 6' 2.5\" (1.892 m)   Wt 84.2 kg (185 lb 9.6 oz)   SpO2 96%   BMI 23.51 kg/m²      Physical Exam  Constitutional:       Appearance: He is not toxic-appearing.   HENT:      Right Ear: Tympanic membrane normal.      Left Ear: Tympanic membrane normal.      Nose: Congestion present.      Mouth/Throat:      Pharynx: No oropharyngeal exudate or posterior oropharyngeal erythema.   Cardiovascular:      Rate and Rhythm: Normal rate and regular rhythm.   Pulmonary:      Effort: Pulmonary effort is normal.      Comments: Breath sounds noticeably disc decreased.  Periodic cough during exam respirations unlabored.  Neurological:      Mental Status: He is alert.   Psychiatric:         Mood and " Affect: Mood normal.

## 2024-12-24 NOTE — PSYCH
MEDICATION MANAGEMENT NOTE        [facility]      Name and Date of Birth:  Leonardo Montenegro 24 y.o. 2000 MRN: [id number]    Date of Visit: December 26, 2024    Reason for Visit: Follow-up visit for medication management     Virtual Visit Disclaimer:       TeleMed provider: Vijay Aguilera MD.   Location: [place]     Verification of patient location:     Patient is currently located in the [facility]  Patient is currently located in a state in which I am licensed     After connecting through [employer], the patient was identified by name and date of birth.  Leonardo Montenegro was informed that this is a telemedicine visit that is being conducted through [employer], and the patient was informed that this is a secure, HIPAA-compliant platform. My office door was closed. No one else was in the room. Leonardo Montenegro acknowledged consent and understanding of privacy and security of the video platform. Leonardo understands that the online visit is based solely on information provided by the patient, and that, in the absence of a face-to-face physical evaluation by the physician, the diagnosis Leonardo  receives is both limited and provisional in terms of accuracy and completeness. Leonardo Montenegro understands that they can discontinue the visit at any time. I informed Leonardo that I have reviewed their record in [facility] and presented the opportunity for them to ask any questions regarding the visit today. Leonardo Montenegro voiced understanding and consented to these terms. Leonardo is aware this is a billable service. Leonardo is present at home.      SUBJECTIVE:    Leonardo Montenegro is a 24 y.o. male with past psychiatric history significant for generalized anxiety disorder, OCD, intermittent explosive disorder, insomnia and cannabis use (medical and recreational) who was personally seen and evaluated today at the [facility] outpatient clinic for follow-up and medication management.  "Leonardo presents as pleasant and cooperative. His thoughts are linear and organized. He completes assessment without difficulty.     Leonardo endorses compliance with psychotropic medication regimen consisting of Prozac. He denies adverse medication side effects. Leonardo endorses psychiatric stability since last visit. He denies new onset depression or anxiety concerns. He is without anhedonia, crying spells, or feelings of apathy. He enjoyed time spent with cousins over [place] and found pleasure in his recent trip to [place]. Leonardo's sleep and appetite are stable. No SI/HI currently. He is future-oriented, detailing plans to move to [place] to start a job in [employer], in January 2025. He was applauded for his efforts and congratulated. Leonardo is without recent changes in focus or concentration. No current feelings of hopelessness. No recent anger outbursts, mood dysregulation, or aggression. He has been subjectively \"pretty good overall\" Leonardo currently endorses occasional and appropriate anxiety that is not pathologic in nature. Leonardo denies recent periods of extreme or excessive nervousness, irrational worry, or overt anxiousness. Leonardo is not currently restless or tense nor does Leonardo feel \"keyed up\" or on-edge. Leonardo denies new-onset panic symptomatology or maladaptive behaviors. Throughout today's session, Leonardo does not appear visibly perturbed. At the moment, Leonardo does not exhibit objective evidence of nilda/hypomania or psychosis. Leonardo is not currently irritable, grandiose, labile, or pathologically euphoric. Leonardo is without perceptual disturbances, such as A/V hallucinations, paranoia, ideas of reference, or delusional beliefs. Leonardo denies recent ETOH or illicit substance abuse. Leonardo offers no further concerns.       Current Rating Scores:     None completed today.    Review Of Systems:      Constitutional negative   ENT negative   Cardiovascular negative   Respiratory negative   Gastrointestinal negative "   Genitourinary negative   Musculoskeletal negative   Integumentary negative   Neurological negative   Endocrine negative   Other Symptoms none, all other systems are negative       Past Psychiatric History: (unchanged information from previous note copied and italicized) - Information that is bolded has been updated.      Inpatient psychiatric admissions: Denies  Prior outpatient psychiatric linkage: Denies  Past/current psychotherapy: No longer linked with Nahomi Telly (psychotherapy)   History of suicidal attempts/gestures: Denies  History of violence/aggressive behaviors: Yes, towards parents, peers and authoritative figures  Psychotropic medication trials: Paxil (D/C because of fatigue), Prozac (now), Hydroxyzine  Substance abuse inpatient/outpatient rehabilitation: Denies     Substance Abuse History: (unchanged information from previous note copied and italicized) - Information that is bolded has been updated.      No history of ETOH or tobacco abuse. However, patient does use cannabis daily. No past legal actions or arrests secondary to substance intoxication. The patient denies prior DWIs/DUIs. No history of outpatient/inpatient rehabilitation programs. Leonardo does not exhibit objective evidence of substance withdrawal during today's examination nor does Leonardo appear under the influence of any psychoactive substance.          Social History: (unchanged information from previous note copied and italicized) - Information that is bolded has been updated.      Developmental: Denies a history of milestone/developmental delay. Denies a history of in-utero exposure to toxins/illicit substances. There is no documented history of IEP or need for special education.  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 Leonardo Montenegro has no history of arrests or violence with a deadly weapon.      Traumatic History: (unchanged information from previous note copied and italicized) - Information that is bolded has been updated.      Abuse:none is reported  Other Traumatic Events: Denies      Past Medical History:    Past Medical History:   Diagnosis Date    Acute appendicitis 3/11/2022    Allergic     enviromental    Asthma     Concussion     Sprain and strain of left wrist         Past Surgical History:   Procedure Laterality Date    APPENDECTOMY  03/12/2022    APPENDECTOMY LAPAROSCOPIC N/A 3/12/2022    Procedure: APPENDECTOMY LAPAROSCOPIC;  Surgeon: Cezar Metzger MD;  Location: [facility];  Service: General    LYMPH NODE DISSECTION  2010    MYRINGOTOMY W/ TUBES       No Known Allergies    Substance Abuse History:    Social History     Substance and Sexual Activity   Alcohol Use Never     Social History     Substance and Sexual Activity   Drug Use Never       Social History:    Social History     Socioeconomic History    Marital status: Single     Spouse name: Not on file    Number of children: Not on file    Years of education: Not on file    Highest education level: Not on file   Occupational History    Not on file   Tobacco Use    Smoking status: Never    Smokeless tobacco: Never   Vaping Use    Vaping status: Former   Substance and Sexual Activity    Alcohol use: Never    Drug use: Never    Sexual activity: Not Currently     Partners: Female   Other Topics Concern    Not on file   Social History Narrative    Not on file     Social Drivers of Health     Financial Resource Strain: Not on file   Food Insecurity: Not on file   Transportation Needs: Not on file   Physical Activity: Not on file   Stress: Not on file   Social Connections: Unknown (6/18/2024)    Received from [facility]    Social Connections     How often do you feel lonely or isolated from those around you? (Adult -  for ages 18 years and over): Not on file   Intimate Partner Violence: Not on file   Housing Stability: Not on file       Family Psychiatric History:     Family History   Problem Relation Age of Onset    No Known Problems Father        History Review: The following portions of the patient's history were reviewed and updated as appropriate: allergies, current medications, past family history, past medical history, past social history, past surgical history, and problem list.         OBJECTIVE:     Vital signs in last 24 hours:    There were no vitals filed for this visit.    Mental Status Evaluation:    Appearance age appropriate, casually dressed, dressed appropriately, looks stated age   Behavior pleasant, cooperative, calm, good eye contact   Speech normal rate, normal volume, normal pitch   Mood euthymic   Affect normal range and intensity, appropriate   Thought Processes organized, coherent, goal directed   Associations intact associations   Thought Content no overt delusions   Perceptual Disturbances: no auditory hallucinations, no visual hallucinations   Abnormal Thoughts  Risk Potential Suicidal ideation - None at present  Homicidal ideation - None at present  Potential for aggression - No   Orientation oriented to person, place, time/date, and situation   Memory recent and remote memory grossly intact   Consciousness alert and awake   Attention Span Concentration Span attention span and concentration are age appropriate   Intellect appears to be of average intelligence   Insight intact and good   Judgement intact and good   Muscle Strength and  Gait unable to assess today due to virtual visit   Motor activity no abnormal movements   Language no difficulty naming common objects   Fund of Knowledge adequate knowledge of current events   Pain none   Pain Scale Did not ask patient to formally rate       Laboratory Results: I have personally reviewed all pertinent laboratory/tests results    Recent Labs:   Office  "Visit on 12/06/2024   Component Date Value    POCT SARS-CoV-2 Ag 12/06/2024 Negative     VALID CONTROL 12/06/2024 Valid     RAPID FLU A 12/06/2024 neg     RAPID FLU B 12/06/2024 neg        Suicide/Homicide Risk Assessment:    The following interventions are recommended: continue medication management, no intervention changes needed      Lethality Statement:    Based on today's assessment and clinical criteria, Leonardo Montenegro contracts for safety and is not an imminent risk of harm to self or others. Outpatient level of care is deemed appropriate at this current time. Leonardo understands that if they can no longer contract for safety, they need to call the office or report to their nearest Emergency Room for immediate evaluation.      Assessment/Plan:     Leonardo Montenegro is a 24 y.o. male with past psychiatric history significant for generalized anxiety disorder, OCD, intermittent explosive disorder, insomnia and cannabis use (medical and recreational) who was evaluated today at the [facility] outpatient clinic for follow-up and medication management. Leonardo is currently linked with Nahomi Varner for individualized psychotherapy of which he admits to be beneficial. Leonardo states that he has a longstanding history of intermittent explosive disorder and oppositionality. In an impressionistic manner, he is braggadocios regarding past events in which he verbally attacked his parents, peers, and authoritative figures (like teachers). He speaks at length about \"getting money\" and how he only cares about \"his fam, marijuana, and cash\". A review of documentation reveals a past history of anger outbursts, vindictiveness, mood lability which appears purposeful. Leonardo is proud to share that he often \"puts people in their place\" when confronted. He hypothesizes this is secondary to \"nilda\" and hints at a diagnostic history of nilda/hypomania. However, upon extensive questioning, Leonardo has no diagnostic indicators " "suggestive of an underlying bipolar chemistry. He is able to act appropriately at work ([employer]) and has never been suspended or expelled from school in the past, suggestive of his ability to \"switch it on and off\" willfully. Leonardo admits to daily cannabis abuse, stating \"it's the only thing\" that manages his anxiety. He states that his excessive worry and nervousness began in HS and has intensified over the last few years. He has no prior psychotropic medication trials but states that therapy has been helpful. During intake assessment, he laughed and appeared at ease, however, he completed a HAVEN-7 questionnaire which resulted in a score of 18. His answers and inflammatory statements are incongruent with his affect. During intake, Leonardo endorsed several neurovegetative symptoms of depression, such as poor sleep and limited energy. Leonardo speaks at length about his \"rule of 6's\" today, stating that he often has \" 6 thoughts\" in his mind at all times. He perseverates on the need for even numbers and is rigid and inflexible when \"things don't go his way\". He states that he experiences intrusive and persistent thoughts and behaves in a manner to neutralize these thoughts. He finds that he only eats even number foods (ie 4 or 6 fries at one time) and will have to touch a crack in a sidewalk with his left food if he touched it with his right foot. He denies any signs/symptoms suggestive of PTSD or disordered eating.      Today's Plan:     Psychopharmacologically, Leonardo reports benefit and tolerability with Prozac. He denies new onset concerns or need for medication intervention/change.        DSM-V Diagnoses:      1.) Generalized Anxiety Disorder  2.) OCD  3.) Intermittent Explosive Disorder               -R/O ODD  4.) Cannabis Use (medical and recreational)  5.) Insomnia   6.) Alcohol abuse - resolved        Treatment Recommendations/Precautions:      1.) Generalized Anxiety Disorder  - Continue Prozac 40mg Dailly      "   2.) OCD  - Continue Prozac 40mg Dailly        3.) Intermittent Explosive Disorder               -R/O ODD  - Continue Prozac 40mg Dailly  - Consider use of Lamictal in future         4.) Cannabis Use (medical and recreational)  - Psychoeducation provided regarding the importance of exercise and healthy dietary choices and their impact on mood, energy, and motivation  - Counseled to avoid ETOH, illict substances, and nicotine secondary to the detrimental effects of these substances on mental and physical health  - Not willing to engage in sobriety at this juncture      5.) Insomnia   - No current tx     6.) Alcohol abuse- resolved   - Counseled to avoid ETOH, illict substances, and nicotine secondary to the detrimental effects of these substances on mental and physical health    Assessment & Plan  Mixed obsessional thoughts and acts         Generalized anxiety disorder         Intermittent explosive disorder in adult             Does not want any medication changes  Aware of need to follow up with family physician for medical issues  Aware of 24 hour and weekend coverage for urgent situations accessed by calling [facility] main practice number    Medications Risks/Benefits      Risks, Benefits And Possible Side Effects Of Medications:    Risks, benefits, and possible side effects of medications explained to Leonardo including risk of suicidality and serotonin syndrome related to treatment with antidepressants. He verbalizes understanding and agreement for treatment.    Controlled Medication Discussion:     Not applicable    Psychotherapy Provided:     Individual psychotherapy provided: No     Treatment Plan:    Completed and signed during the session: Yes - Treatment Plan done but not signed at time of office visit due to:  Plan reviewed by video and verbal consent given due to virtual visit.      Visit Time    Visit Start Time: 12:30 PM  Visit Stop Time: 1:00 PM  Total Visit Duration:  30  minutes     The total visit duration detailed above includes: patient engagement, medication management, psychotherapy/counseling, discussion regarding treatment goals, documentation, review of past medical records, and coordination of care.      Note Share Disclaimer:     This note was not shared with the patient due to reasonable likelihood of causing patient harm      Vijay Aguilera MD  Board Certified Diplomate of the American Board of Psychiatry and Neurology  12/26/24

## 2024-12-26 ENCOUNTER — TELEPHONE (OUTPATIENT)
Dept: PSYCHIATRY | Facility: CLINIC | Age: 24
End: 2024-12-26

## 2024-12-26 ENCOUNTER — TELEMEDICINE (OUTPATIENT)
Dept: PSYCHIATRY | Facility: CLINIC | Age: 24
End: 2024-12-26
Payer: COMMERCIAL

## 2024-12-26 DIAGNOSIS — F63.81 INTERMITTENT EXPLOSIVE DISORDER IN ADULT: ICD-10-CM

## 2024-12-26 DIAGNOSIS — F41.1 GENERALIZED ANXIETY DISORDER: ICD-10-CM

## 2024-12-26 DIAGNOSIS — F42.2 MIXED OBSESSIONAL THOUGHTS AND ACTS: Primary | ICD-10-CM

## 2024-12-26 PROCEDURE — 99213 OFFICE O/P EST LOW 20 MIN: CPT | Performed by: STUDENT IN AN ORGANIZED HEALTH CARE EDUCATION/TRAINING PROGRAM

## 2024-12-26 RX ORDER — FLUOXETINE 40 MG/1
40 CAPSULE ORAL DAILY
Qty: 90 CAPSULE | Refills: 2 | Status: SHIPPED | OUTPATIENT
Start: 2024-12-26

## 2024-12-26 NOTE — BH TREATMENT PLAN
TREATMENT PLAN (Medication Management Only)        [facility]      Name and Date of Birth:  Leonardo Montenegro 24 y.o. 2000 MRN: [id number]    Date of Visit: December 26, 2024    Diagnosis/Diagnoses:    1. Mixed obsessional thoughts and acts        2. Generalized anxiety disorder        3. Intermittent explosive disorder in adult            Strengths/Personal Resources for Self-Care: supportive family, supportive friends, taking medications as prescribed, ability to adapt to life changes, ability to communicate needs, ability to communicate well, ability to listen, ability to reason, ability to understand psychiatric illness, average or above intelligence, family ties, general fund of knowledge, good physical health, good understanding of illness, independence, motivation for treatment, ability to negotiate basic needs, being resoureceful, special hobby/interest.    Area/Areas of need (in own words): mood instability    1. Long Term Goal: maintain control of mood.  Target Date:6 months - 6/26/2025  Person/Persons responsible for completion of goal: Leonardo    2.  Short Term Objective (s) - How will we reach this goal?:   A. Provider new recommended medication/dosage changes and/or continue medication(s): continue current medications as prescribed.  B. Keep regularly scheduled psychiatric appointments  C. Maintain adherence to psychotropic medication regimen   D. Exercise regularly (at least 30 mins)  E. Maintain appropriate dietary intake  F. Practice adequate sleep hygiene  G. Start new job in [place] in January 2025  H. Move to [facility]      Target Date:6 months - 6/26/2025  Person/Persons Responsible for Completion of Goal: Leonardo    Progress Towards Goals: continuing treatment    Treatment Modality: medication management every 3 months    Review due 180 days from date of this plan: 6 months - 6/26/2025  Expected length of service: ongoing treatment    My  Physician/PA/NP and I have developed this plan together and I agree to work on the goals and objectives. I understand the treatment goals that were developed for my treatment. Treatment Plan was completed with Leonardo's assistance and input throughout today's session. Leonardo consented to the information provided and documented above. Unfortunately, treatment plan was not physically signed at the time of this office visit secondary to time constraints and issues related to signature keypad. Again, Leonardo did verbally consent.

## 2024-12-26 NOTE — TELEPHONE ENCOUNTER
Called and left message for patient to return a call to [phone] and schedule 6 month follow up with provider (Dr Aguilera). Please schedule upon return call. Thank you.

## (undated) DEVICE — 4-PORT MANIFOLD: Brand: NEPTUNE 2

## (undated) DEVICE — ADHESIVE SKIN HIGH VISCOSITY EXOFIN 1ML

## (undated) DEVICE — ENDOPOUCH RETRIEVER SPECIMEN RETRIEVAL BAGS: Brand: ENDOPOUCH RETRIEVER

## (undated) DEVICE — 3M™ TEGADERM™ TRANSPARENT FILM DRESSING FRAME STYLE, 1624W, 2-3/8 IN X 2-3/4 IN (6 CM X 7 CM), 100/CT 4CT/CASE: Brand: 3M™ TEGADERM™

## (undated) DEVICE — Device

## (undated) DEVICE — INTENDED FOR TISSUE SEPARATION, AND OTHER PROCEDURES THAT REQUIRE A SHARP SURGICAL BLADE TO PUNCTURE OR CUT.: Brand: BARD-PARKER SAFETY BLADES SIZE 15, STERILE

## (undated) DEVICE — GLOVE INDICATOR PI UNDERGLOVE SZ 6.5 BLUE

## (undated) DEVICE — ENDOPATH XCEL BLADELESS TROCARS WITH STABILITY SLEEVES: Brand: ENDOPATH XCEL

## (undated) DEVICE — Device: Brand: OMNICLOSE TROCAR SITE CLOSURE DEVICE

## (undated) DEVICE — IRRIGATOR DISPOSABLE SUCTION

## (undated) DEVICE — PACK PBDS LAP CHOLE RF

## (undated) DEVICE — GLOVE INDICATOR PI UNDERGLOVE SZ 7 BLUE

## (undated) DEVICE — ENDOPATH ETS-FLEX45 ARTICULATING ENDOSCOPIC LINEAR CUTTER, NO RELOAD: Brand: ENDOPATH

## (undated) DEVICE — GLOVE SRG BIOGEL 7

## (undated) DEVICE — TROCARS: Brand: KII® BALLOON BLUNT TIP SYSTEM

## (undated) DEVICE — GLOVE SRG BIOGEL 6.5

## (undated) DEVICE — GLOVE INDICATOR PI UNDERGLOVE SZ 8 BLUE

## (undated) DEVICE — ENDOPATH ETS45 2.5MM RELOADS (VASCULAR/THIN): Brand: ENDOPATH

## (undated) DEVICE — CHLORAPREP HI-LITE 26ML ORANGE

## (undated) DEVICE — TROCAR: Brand: KII FIOS FIRST ENTRY

## (undated) DEVICE — SPONGE GAUZE 2 X 2 4PLY STRL

## (undated) DEVICE — TUBING SMOKE EVAC W/FILTRATION DEVICE PLUMEPORT ACTIV

## (undated) DEVICE — LIGAMAX 5 MM ENDOSCOPIC MULTIPLE CLIP APPLIER: Brand: LIGAMAX

## (undated) DEVICE — PENCIL ROCKER SWITCH CAUTERY HAND CONTROL

## (undated) DEVICE — SUT VICRYL 0 UR-6 27 IN J603H

## (undated) DEVICE — SUT MONOCRYL 4-0 PS-2 27 IN Y426H

## (undated) DEVICE — 5 MM CURVED DISSECTORS WITH MONOPOLAR CAUTERY: Brand: ENDOPATH

## (undated) DEVICE — ENDOPATH 5MM CURVED SCISSORS WITH MONOPOLAR CAUTERY: Brand: ENDOPATH

## (undated) DEVICE — NEEDLE 25G X 1 1/2

## (undated) DEVICE — ETS45 RELOAD STANDARD 45MM: Brand: ENDOPATH

## (undated) DEVICE — GLOVE SRG BIOGEL 8